# Patient Record
Sex: MALE | Race: BLACK OR AFRICAN AMERICAN | Employment: UNEMPLOYED | URBAN - METROPOLITAN AREA
[De-identification: names, ages, dates, MRNs, and addresses within clinical notes are randomized per-mention and may not be internally consistent; named-entity substitution may affect disease eponyms.]

---

## 2019-09-24 ENCOUNTER — HOSPITAL ENCOUNTER (INPATIENT)
Age: 49
LOS: 6 days | Discharge: HOME | DRG: 751 | End: 2019-09-30
Attending: PSYCHIATRY & NEUROLOGY | Admitting: PSYCHIATRY & NEUROLOGY
Payer: MEDICAID

## 2019-09-24 PROBLEM — F32.9 MAJOR DEPRESSIVE DISORDER: Status: ACTIVE | Noted: 2019-09-24

## 2019-09-24 PROCEDURE — 74011250637 HC RX REV CODE- 250/637: Performed by: PSYCHIATRY & NEUROLOGY

## 2019-09-24 PROCEDURE — 65220000003 HC RM SEMIPRIVATE PSYCH

## 2019-09-24 RX ORDER — GABAPENTIN 300 MG/1
300 CAPSULE ORAL
COMMUNITY
End: 2019-09-30

## 2019-09-24 RX ORDER — FLUTICASONE PROPIONATE AND SALMETEROL 250; 50 UG/1; UG/1
1 POWDER RESPIRATORY (INHALATION) EVERY 12 HOURS
COMMUNITY

## 2019-09-24 RX ORDER — LORAZEPAM 2 MG/ML
1-2 INJECTION INTRAMUSCULAR
Status: DISCONTINUED | OUTPATIENT
Start: 2019-09-24 | End: 2019-09-30 | Stop reason: HOSPADM

## 2019-09-24 RX ORDER — SEVELAMER CARBONATE 800 MG/1
800 TABLET, FILM COATED ORAL
Status: DISCONTINUED | OUTPATIENT
Start: 2019-09-24 | End: 2019-09-25

## 2019-09-24 RX ORDER — BUDESONIDE AND FORMOTEROL FUMARATE DIHYDRATE 160; 4.5 UG/1; UG/1
2 AEROSOL RESPIRATORY (INHALATION)
Status: DISCONTINUED | OUTPATIENT
Start: 2019-09-24 | End: 2019-09-30 | Stop reason: HOSPADM

## 2019-09-24 RX ORDER — HYDRALAZINE HYDROCHLORIDE 50 MG/1
50 TABLET, FILM COATED ORAL
COMMUNITY

## 2019-09-24 RX ORDER — SEVELAMER CARBONATE 800 MG/1
800 TABLET, FILM COATED ORAL 3 TIMES DAILY
COMMUNITY

## 2019-09-24 RX ORDER — FLUOXETINE HYDROCHLORIDE 20 MG/1
40 CAPSULE ORAL DAILY
Status: DISCONTINUED | OUTPATIENT
Start: 2019-09-25 | End: 2019-09-30 | Stop reason: HOSPADM

## 2019-09-24 RX ORDER — HALOPERIDOL 5 MG/1
5 TABLET ORAL
Status: DISCONTINUED | OUTPATIENT
Start: 2019-09-24 | End: 2019-09-25

## 2019-09-24 RX ORDER — HYDROXYZINE PAMOATE 50 MG/1
50 CAPSULE ORAL
Status: DISCONTINUED | OUTPATIENT
Start: 2019-09-24 | End: 2019-09-30 | Stop reason: HOSPADM

## 2019-09-24 RX ORDER — HYDRALAZINE HYDROCHLORIDE 50 MG/1
50 TABLET, FILM COATED ORAL
Status: DISCONTINUED | OUTPATIENT
Start: 2019-09-24 | End: 2019-09-25

## 2019-09-24 RX ORDER — AMLODIPINE BESYLATE 5 MG/1
10 TABLET ORAL DAILY
Status: DISCONTINUED | OUTPATIENT
Start: 2019-09-25 | End: 2019-09-30 | Stop reason: HOSPADM

## 2019-09-24 RX ORDER — ACETAMINOPHEN 500 MG
500 TABLET ORAL
Status: DISCONTINUED | OUTPATIENT
Start: 2019-09-24 | End: 2019-09-30 | Stop reason: HOSPADM

## 2019-09-24 RX ORDER — MONTELUKAST SODIUM 10 MG/1
10 TABLET ORAL DAILY
COMMUNITY

## 2019-09-24 RX ORDER — HALOPERIDOL 5 MG/ML
5 INJECTION INTRAMUSCULAR
Status: DISCONTINUED | OUTPATIENT
Start: 2019-09-24 | End: 2019-09-25

## 2019-09-24 RX ORDER — DOXEPIN HYDROCHLORIDE 50 MG/1
50 CAPSULE ORAL
COMMUNITY
End: 2019-09-30

## 2019-09-24 RX ORDER — TRAZODONE HYDROCHLORIDE 50 MG/1
50 TABLET ORAL
Status: DISCONTINUED | OUTPATIENT
Start: 2019-09-24 | End: 2019-09-27

## 2019-09-24 RX ORDER — NITROGLYCERIN 20 MG/1
1 PATCH TRANSDERMAL DAILY
COMMUNITY
End: 2019-09-30

## 2019-09-24 RX ORDER — IPRATROPIUM BROMIDE AND ALBUTEROL SULFATE 2.5; .5 MG/3ML; MG/3ML
3 SOLUTION RESPIRATORY (INHALATION)
COMMUNITY

## 2019-09-24 RX ORDER — FLUOXETINE HYDROCHLORIDE 40 MG/1
40 CAPSULE ORAL DAILY
Status: ON HOLD | COMMUNITY
End: 2019-09-29 | Stop reason: SDUPTHER

## 2019-09-24 RX ORDER — AMLODIPINE BESYLATE AND ATORVASTATIN CALCIUM 10; 10 MG/1; MG/1
1 TABLET, FILM COATED ORAL DAILY
COMMUNITY

## 2019-09-24 RX ORDER — IPRATROPIUM BROMIDE AND ALBUTEROL SULFATE 2.5; .5 MG/3ML; MG/3ML
3 SOLUTION RESPIRATORY (INHALATION)
Status: DISCONTINUED | OUTPATIENT
Start: 2019-09-24 | End: 2019-09-30 | Stop reason: HOSPADM

## 2019-09-24 RX ADMIN — TRAZODONE HYDROCHLORIDE 50 MG: 50 TABLET ORAL at 20:55

## 2019-09-24 RX ADMIN — HYDROXYZINE PAMOATE 50 MG: 50 CAPSULE ORAL at 20:55

## 2019-09-25 PROBLEM — E11.8 TYPE 2 DIABETES MELLITUS WITH COMPLICATION, WITHOUT LONG-TERM CURRENT USE OF INSULIN (HCC): Status: ACTIVE | Noted: 2019-09-25

## 2019-09-25 PROBLEM — N18.6 ESRD (END STAGE RENAL DISEASE) ON DIALYSIS (HCC): Status: ACTIVE | Noted: 2019-09-25

## 2019-09-25 PROBLEM — F12.20 CANNABIS USE DISORDER, SEVERE, DEPENDENCE (HCC): Status: ACTIVE | Noted: 2019-09-25

## 2019-09-25 PROBLEM — I15.1 HYPERTENSION SECONDARY TO OTHER RENAL DISORDERS: Status: ACTIVE | Noted: 2019-09-25

## 2019-09-25 PROBLEM — N28.89 HYPERTENSION SECONDARY TO OTHER RENAL DISORDERS: Status: ACTIVE | Noted: 2019-09-25

## 2019-09-25 PROBLEM — Z99.2 ESRD (END STAGE RENAL DISEASE) ON DIALYSIS (HCC): Status: ACTIVE | Noted: 2019-09-25

## 2019-09-25 PROBLEM — F14.20 COCAINE USE DISORDER, SEVERE, DEPENDENCE (HCC): Status: ACTIVE | Noted: 2019-09-25

## 2019-09-25 PROBLEM — F33.9 RECURRENT MAJOR DEPRESSIVE DISORDER (HCC): Status: ACTIVE | Noted: 2019-09-24

## 2019-09-25 LAB
ALBUMIN SERPL-MCNC: 3.6 G/DL (ref 3.4–5)
ANION GAP SERPL CALC-SCNC: 11 MMOL/L (ref 3–18)
BUN SERPL-MCNC: 101 MG/DL (ref 7–18)
BUN/CREAT SERPL: 8 (ref 12–20)
CALCIUM SERPL-MCNC: 6.9 MG/DL (ref 8.5–10.1)
CALCIUM SERPL-MCNC: 7.4 MG/DL (ref 8.5–10.1)
CHLORIDE SERPL-SCNC: 101 MMOL/L (ref 100–111)
CO2 SERPL-SCNC: 25 MMOL/L (ref 21–32)
CREAT SERPL-MCNC: 12.3 MG/DL (ref 0.6–1.3)
FERRITIN SERPL-MCNC: 725 NG/ML (ref 8–388)
GLUCOSE SERPL-MCNC: 97 MG/DL (ref 74–99)
HCT VFR BLD AUTO: 27.8 % (ref 36–48)
HGB BLD-MCNC: 9.1 G/DL (ref 13–16)
IRON SATN MFR SERPL: 35 %
IRON SERPL-MCNC: 102 UG/DL (ref 50–175)
PHOSPHATE SERPL-MCNC: 6.7 MG/DL (ref 2.5–4.9)
POTASSIUM SERPL-SCNC: 5.2 MMOL/L (ref 3.5–5.5)
PTH-INTACT SERPL-MCNC: 1072.5 PG/ML (ref 18.4–88)
SODIUM SERPL-SCNC: 137 MMOL/L (ref 136–145)
TIBC SERPL-MCNC: 288 UG/DL (ref 250–450)

## 2019-09-25 PROCEDURE — 65220000003 HC RM SEMIPRIVATE PSYCH

## 2019-09-25 PROCEDURE — 83540 ASSAY OF IRON: CPT

## 2019-09-25 PROCEDURE — 74011250637 HC RX REV CODE- 250/637: Performed by: INTERNAL MEDICINE

## 2019-09-25 PROCEDURE — 85018 HEMOGLOBIN: CPT

## 2019-09-25 PROCEDURE — 82728 ASSAY OF FERRITIN: CPT

## 2019-09-25 PROCEDURE — 74011250637 HC RX REV CODE- 250/637: Performed by: PSYCHIATRY & NEUROLOGY

## 2019-09-25 PROCEDURE — 83970 ASSAY OF PARATHORMONE: CPT

## 2019-09-25 PROCEDURE — 74011250636 HC RX REV CODE- 250/636: Performed by: INTERNAL MEDICINE

## 2019-09-25 PROCEDURE — 80069 RENAL FUNCTION PANEL: CPT

## 2019-09-25 PROCEDURE — 36415 COLL VENOUS BLD VENIPUNCTURE: CPT

## 2019-09-25 PROCEDURE — 90935 HEMODIALYSIS ONE EVALUATION: CPT

## 2019-09-25 RX ORDER — HYDRALAZINE HYDROCHLORIDE 50 MG/1
50 TABLET, FILM COATED ORAL 4 TIMES DAILY
Status: DISCONTINUED | OUTPATIENT
Start: 2019-09-25 | End: 2019-09-25

## 2019-09-25 RX ORDER — TRAMADOL HYDROCHLORIDE AND ACETAMINOPHEN 37.5; 325 MG/1; MG/1
1 TABLET ORAL
Status: DISPENSED | OUTPATIENT
Start: 2019-09-25 | End: 2019-09-29

## 2019-09-25 RX ORDER — CALCIUM ACETATE 667 MG/1
3 CAPSULE ORAL
Status: DISCONTINUED | OUTPATIENT
Start: 2019-09-25 | End: 2019-09-30 | Stop reason: HOSPADM

## 2019-09-25 RX ORDER — HYDRALAZINE HYDROCHLORIDE 50 MG/1
100 TABLET, FILM COATED ORAL 3 TIMES DAILY
Status: DISCONTINUED | OUTPATIENT
Start: 2019-09-25 | End: 2019-09-30 | Stop reason: HOSPADM

## 2019-09-25 RX ORDER — HEPARIN SODIUM 5000 [USP'U]/ML
4000 INJECTION, SOLUTION INTRAVENOUS; SUBCUTANEOUS
Status: DISCONTINUED | OUTPATIENT
Start: 2019-09-25 | End: 2019-09-26

## 2019-09-25 RX ORDER — DOXERCALCIFEROL 4 UG/2ML
3 INJECTION INTRAVENOUS
Status: DISCONTINUED | OUTPATIENT
Start: 2019-09-25 | End: 2019-09-30 | Stop reason: HOSPADM

## 2019-09-25 RX ORDER — SODIUM CHLORIDE 9 MG/ML
100 INJECTION, SOLUTION INTRAVENOUS
Status: DISCONTINUED | OUTPATIENT
Start: 2019-09-25 | End: 2019-09-30 | Stop reason: HOSPADM

## 2019-09-25 RX ADMIN — DOXERCALCIFEROL 3 MCG: 4 INJECTION, SOLUTION INTRAVENOUS at 19:05

## 2019-09-25 RX ADMIN — SEVELAMER CARBONATE 800 MG: 800 TABLET, FILM COATED ORAL at 11:25

## 2019-09-25 RX ADMIN — TRAMADOL HYDROCHLORIDE AND ACETAMINOPHEN 1 TABLET: 37.5; 325 TABLET, FILM COATED ORAL at 16:39

## 2019-09-25 RX ADMIN — BUDESONIDE AND FORMOTEROL FUMARATE DIHYDRATE 2 PUFF: 160; 4.5 AEROSOL RESPIRATORY (INHALATION) at 20:38

## 2019-09-25 RX ADMIN — AMLODIPINE BESYLATE 10 MG: 5 TABLET ORAL at 08:08

## 2019-09-25 RX ADMIN — HYDROXYZINE PAMOATE 50 MG: 50 CAPSULE ORAL at 16:39

## 2019-09-25 RX ADMIN — BUDESONIDE AND FORMOTEROL FUMARATE DIHYDRATE 2 PUFF: 160; 4.5 AEROSOL RESPIRATORY (INHALATION) at 09:11

## 2019-09-25 RX ADMIN — HYDRALAZINE HYDROCHLORIDE 50 MG: 50 TABLET, FILM COATED ORAL at 17:22

## 2019-09-25 RX ADMIN — HYDRALAZINE HYDROCHLORIDE 100 MG: 50 TABLET, FILM COATED ORAL at 20:38

## 2019-09-25 RX ADMIN — TRAMADOL HYDROCHLORIDE AND ACETAMINOPHEN 1 TABLET: 37.5; 325 TABLET, FILM COATED ORAL at 22:16

## 2019-09-25 RX ADMIN — HYDRALAZINE HYDROCHLORIDE 50 MG: 50 TABLET, FILM COATED ORAL at 12:15

## 2019-09-25 RX ADMIN — ACETAMINOPHEN 500 MG: 500 TABLET ORAL at 20:38

## 2019-09-25 RX ADMIN — HEPARIN SODIUM 4000 UNITS: 5000 INJECTION INTRAVENOUS; SUBCUTANEOUS at 15:30

## 2019-09-25 RX ADMIN — TRAZODONE HYDROCHLORIDE 50 MG: 50 TABLET ORAL at 20:37

## 2019-09-25 RX ADMIN — HYDROXYZINE PAMOATE 50 MG: 50 CAPSULE ORAL at 20:38

## 2019-09-25 RX ADMIN — HYDRALAZINE HYDROCHLORIDE 50 MG: 50 TABLET, FILM COATED ORAL at 09:11

## 2019-09-25 RX ADMIN — SEVELAMER CARBONATE 800 MG: 800 TABLET, FILM COATED ORAL at 08:07

## 2019-09-25 RX ADMIN — FLUOXETINE 40 MG: 20 CAPSULE ORAL at 08:07

## 2019-09-25 NOTE — GROUP NOTE
[facility] GROUP DOCUMENTATION INDIVIDUAL Group Therapy Note Date: September 25 Group Start Time: 12 Group End Time: 1500 Group Topic: Medication [facility] 1 ADULT CHEM DEP Breanna Negro, RN 
 
[facility] GROUP DOCUMENTATION GROUP Group Therapy Note Attendees:6 Attendance: Did not attend Zhou Johnson

## 2019-09-25 NOTE — PROGRESS NOTES
conducted an Spirituality Group for [employer] F[facility], who is a 54 y.o.,male. Patient's Primary Language is: [place]. According to the patient's EMR Jewish Affiliation is: Non Mormonism.     The reason the Patient came to the hospital is:   Patient Active Problem List    Diagnosis Date Noted    Major depressive disorder 09/24/2019         conducted Spirituality Group on \"Changes\" and the patient was one of the participants. The  provided the following Interventions:  Continued the relationship of care and support. Listened empathically. Offered prayer and assurance of continued prayer on patients behalf. Chart reviewed. The following outcomes were achieved:  Patient expressed gratitude for 's visit.  w  Assessment:  There are no further spiritual or Taoist issues which require Spiritual Care Services interventions at this time. Plan:  Chaplains will continue to follow and will provide pastoral care on an as needed/requested basis.  recommends bedside caregivers page  on duty if patient shows signs of acute spiritual or emotional distress. Chaplain Tania Fuentes [facility]   [phone]
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
## 2019-09-25 NOTE — DIALYSIS
ACUTE HEMODIALYSIS FLOW SHEET    HEMODIALYSIS ORDERS: Physician: Dr. Larsen Remedies: Revaclear   Duration: 4 hr  BFR: 400   DFR: 800   Dialysate:  Temp 36.5   K+   2    Ca+  3   Na 138   Bicarb 35   Wt Readings from Last 1 Encounters:   No data found for Wt    Patient Chart [x]   Unable to Obtain []  Dry weight/UF Goal: 4000 ml  Access RUE AVF     Heparin [x]  Bolus    Units    [] Hourly    Units    []None      Catheter locking solution n/a   Pre BP:   195/99    Pulse:  94   Respirations: 20    Temperature:  98.0    Tx: NSS   ml/Bolus   [] N/A   Labs: []  Pre  []  Post:   [x] N/A   Additional Orders(medications, blood products, hypotension management): [x] Yes   [] No     [x]  [facility] Consent Verified     CATHETER ACCESS:  [x]N/A   []Right   []Left   []IJ     []Fem   []Chest wall   [] First use X-ray verified     []Tunnel    [] Non Tunneled   []No S/S infection  []Redness  []Drainage []Cultured []Swelling []Pain   []Medical Aseptic Prep Utilized   []Dressing Changed  [] Biopatch  Date:    []Clotted   []Patent   Flows: []Good  []Poor  []Reversed   If access problem,  notified: []Yes    []N/A        GRAFT/FISTULA ACCESS:   []N/A     [x]Right     []Left     [x]UE     []LE   []AVG   [x]AVF     []Buttonhole    [x]Medical Aseptic Prep Utilized   [x]No S/S infection  []Redness  []Drainage []Cultured  [] Swelling  [] Pain  Bruit:   [x] Strong    [] Weak       Thrill :   [x] Strong    [] Weak     Needle Gauge: 15   Length: 1 inch   If access problem,  notified: []Yes     [x]N/A     Please describe access if present and not used: N/A       GENERAL ASSESSMENT:    LUNGS:  Rate 20   SaO2%      [] Clear  [x] Coarse  [] Crackles  [] Wheezing                                                [] Diminished     Location : []RLL   []LLL    []RUL  []SOFI   Cough: []Productive  [x]Dry  []N/A   Respirations:  [x]Easy  []Labored   Therapy:  [x]RA  []NC  l/min    Mask: []NRB  [] Venti    O2% []Ventilator  []Intubated  [] Trach  [] BiPaP   CARDIAC: [x]Regular      [] Irregular   [] Pericardial Rub  [] JVD          []  Monitored  [] Bedside  [] Remotely monitored     EDEMA: [] None  [x]Generalized  [] Pitting [] 1    [] 2    [] 3    [] 4                 [] Facial  [] Pedal  []  UE  [] LE   SKIN:   [x] Warm  [] Hot     [] Cold   [x] Dry     [] Pale   [] Diaphoretic                  [] Flushed  [] Jaundiced  [] Cyanotic  [] Rash  [] Weeping   LOC:    [x] Alert      [x]Oriented:    [x] Person     [x] Place  [x]Time               [] Confused  [] Lethargic  [] Medicated  [] Non-responsive   GI / ABDOMEN:                     [] Flat    [] Distended    [x] Soft    [] Firm   []  Obese                   [] Diarrhea  [x] Bowel Sounds  [] Nausea  [] Vomiting     / URINE ASSESSMENT:                   [] Voiding   [x] Oliguria  [] Anuria   []  Cervantes                  [] Incontinent  []  Incontinent Brief []  Bathroom Privileges     PAIN:  [x] 0 []1  []2   []3   []4   []5   []6   []7   []8   []9   []10            Scale 0-10  Action/Follow Up:    MOBILITY:  [x] Bed    [] Stretcher      All Vitals and Treatment Details on Attached [address]: [facility]          Room # 127/02   [] 1st Time Acute      [] Stat       [x] Routine      [] Urgent     [x] Acute Room  []  Bedside  [] ICU/CCU  [] ER   Isolation Precautions:  [x] Dialysis    There are currently no Active Isolations       ALLERGIES:     Allergies   Allergen Reactions    Carvedilol Hives    Mushroom Other (comments)     Throat closure and sever tongue edema      Code Status:  No Order     Hepatitis Status   2nd RN check :  [address]   9/13/19 Neg/Suss - results from Chronic clinic      Current Labs:      Lab Results   Component Value Date/Time    HGB 9.1 (L) 09/25/2019 11:33 AM    HCT 27.8 (L) 09/25/2019 11:33 AM     Lab Results   Component Value Date/Time    Sodium 137 09/25/2019 11:33 AM    Potassium 5.2 09/25/2019 11:33 AM    Chloride 101 09/25/2019 11:33 AM CO2 25 09/25/2019 11:33 AM    Anion gap 11 09/25/2019 11:33 AM    Glucose 97 09/25/2019 11:33 AM     (H) 09/25/2019 11:33 AM    Creatinine 12.30 (H) 09/25/2019 11:33 AM    BUN/Creatinine ratio 8 (L) 09/25/2019 11:33 AM    GFR est AA 5 (L) 09/25/2019 11:33 AM    GFR est non-AA 4 (L) 09/25/2019 11:33 AM    Calcium 7.4 (L) 09/25/2019 11:33 AM    Calcium 6.9 (L) 09/25/2019 11:33 AM          DIET:  DIET RENAL      PRIMARY NURSE REPORT:   Pre Dialysis: Hector Delgado RN     Time: 5570        EDUCATION:    [x] Patient [] Other           Knowledge Basis: []None [x]Minimal [] Substantial   Barriers to learning  [x]N/A   [] Access Care     [] S&S of infection  [] Fluid Management  [] K+ [x] Procedural    []Albumin   [] Medications   [] Tx Options   [] Transplant   [] Diet   [] Other   Teaching Tools:  [x] Explain  [] Demo  [] Handouts [] Video  Patient response: [x] Verbalized understanding  [] Teach back  [] Return demonstration   [] Requires follow up      [x]Time Out/Safety Check  [x] Extracorporeal Circuit Tested for integrity       RO/HEMODIALYSIS MACHINE SAFETY CHECKS  Before each treatment:     Machine Number:                   [facility]                                     [x] Unit Machine # 8 with centralized RO                                                                                                     Alarm Test:  Pass time 1500            [x] RO/Machine Log Complete    Machine Temp    36.5             Dialysate: pH  7.4   Conductivity: Meter 14.0    HD Machine  14.0    TCD: 13.7  Dialyzer Lot # [id number]    Blood Tubing Lot # [id number]    Saline Lot # -[id number]     CHLORINE TESTING-Before each treatment and every 4 hours    Total Chlorine: [x] less than 0.1 ppm  Initial Time Check: 1300       4 Hr/2nd Check Time: 1700   (if greater than 0.1 ppm from Primary then every 30 minutes from Secondary)     TREATMENT INITIATION  with Dialysis Precautions:   [x] All Connections Secured [x] Saline Line Double Clamped   [x] Venous Parameters Set               [x] Arterial Parameters Set    [x] Prime Given 250ml NSS              [x]Air Foam Detector Engaged      Treatment Initiation Note:  1500  Pt received to dialysis unit awake , alert and cooperative on room air. 1  RUE AVF cannulated without difficulty and dialysis initiated. During Treatment Notes:  1600  Vascular access visible with arterial and venous line connections intact. Pt sleeping  1700  Vascular access visible with arterial and venous line connections intact. Pt sleeping  1800  Vascular access visible with arterial and venous line connections intact. Pt sleeping  1900  Vascular access visible with arterial and venous line connections intact. Medication Dose Volume Route Time [facility] Name Title   Heparin 4000U  HD 1800 [address], [address] 5 3 mcg  HD [facility] RN     Post Assessment  Dialyzer Cleared:[] Good [x] Fair  [] Poor  Blood processed:  82.6 L  UF Removed:  4000 Ml  Post Wt: 106.5 kg  Post BP:203/96 Pulse:111 Resp:20 Temp:98.4 Lungs: [] Clear [x] Course  [] Crackles                 [] Wheezing [] Diminished   Post Tx Vascular Access: [] N/A  AVF/AVG: Bleeding stopped with  Arterial Pressure for 10 min. Venous Pressure for 10 min      Cardiac:[x] Regular   [] Irregular   Rhythm:  [] Monitored   [] Not Monitored    CVC Catheter: [x] N/A     Edema:  [] None  [x] General [] Facial    [] Pedal   [] UE [] LE   Skin:[x] Warm  [x] Dry [] Diaphoretic               [] Flushed  [] Pale [] Cyanotic   Pain:  [x]0  []1 []2  []3 []4  []5  []6 []7 []8  []9  []10       Post Treatment Note:   1930  Pt tolerated HD well. Arterial and venous cannulas removed and pressure applied until both sites clean and dry.   Dry dressing applied     POST TREATMENT PRIMARY NURSE HANDOFF REPORT:   Post Dialysis: Paul Mcginnis RN                Time:  1930         Abbreviations: AVG-arterial venous graft, AVF-arterial venous fistula, IJ-Internal Jugular, Subcl-Subclavian, Fem-Femoral, Tx-treatment, AP/HR-apical heart rate, DFR-dialysate flow rate, BFR-blood flow rate, AP-arterial pressure, -venous pressure, UF-ultrafiltrate, TMP-transmembrane pressure, Jerry-Venous, Art-Arterial, RO-Reverse Osmosis

## 2019-09-25 NOTE — BH NOTES
Patient arrived to unit via wheelchair, alert and oriented, accompanied by 2 medics and a hospital . Upon arrival, patient denies thoughts to harm self or others and contract to safety. Patient was cooperative with admission process, patient stated that he felt overwhelmed with issues going on with health and does not have any support nor place to live then thought about harming self. Patient stated that he does not drink alcohol but smoke cigarette occasionally. Patient has fistula in right arm for dialysis. He has dialysis scheduled Mondays, Wednesdays, and Fridays. Patient ate 100% meal given to him, encouraged to complete his hygiene, unit tour completed, unit rules and guidelines given. Patient remains on suicide precaution and will be monitor every 15 minutes and staggered for safety. RN will initiate, develop, implement, review or revise treatment plan. Dutch Bell

## 2019-09-25 NOTE — BH NOTES
Treatment team [facility] -     Medical Director: __x___present   Psychiatrist: __x___present   Charge nurse: __x___present   MSW: _x____present   : _____present   Nurse Manager: _____present   Student RNs: _____present   Medical Students: _____present   Art Therapist: _x____present   Clinical Coordinator: __x___present    Occupational Therapist: __x___present   : _______ present  UR  __x_____ present  Crisis Supervisor___x____present      Plan of care discussed and updated as appropriate.

## 2019-09-25 NOTE — CONSULTS
Consult Note  Consult requested by: Dr. Leti Espinosa is a 54 y.o. male [address] who is being seen on consult for ESRD/HD  No chief complaint on file. Admission diagnosis: <principal problem not specified>     HPI: 53 yo AA male admitted to Psych Unit for depression and suicidal ideation. Patient has known Hx of ESRD from HTN and DM and admits he has been non compliant with HD. He follows with Dr. Edita Ralph in [facility] and was last dialyzed in [facility] ([phone]) 2 weeks ago. He was apparently admitted at [facility] for in the last week or so for CHF, Hyperkalemia and uncontrolled HTN. He claims he was dialyzed on Monday before transfer to the Psych unit yesterday. Hx of long standing HTN/DM2/ COPD/Pulmo HTN/GLORIA ESRD x 2 years, non compliant. Hx of chronic cocaine and marijuana use. Hx of depression. Denies any MI/CVA. Anuric. Currently denies any CP or SOB. No problems with HD has a Right AVF, working well. Claims he always runs high BP and he tolerates 4-6 Kg UF/HD    No past medical history on file. No past surgical history on file.       Social History     Socioeconomic History    Marital status: SINGLE     Spouse name: Not on file    Number of children: Not on file    Years of education: Not on file    Highest education level: Not on file   Occupational History    Not on file   Social Needs    Financial resource strain: Not on file    Food insecurity:     Worry: Not on file     Inability: Not on file    Transportation needs:     Medical: Not on file     Non-medical: Not on file   Tobacco Use    Smoking status: Not on file   Substance and Sexual Activity    Alcohol use: Not on file    Drug use: Not on file    Sexual activity: Not on file   Lifestyle    Physical activity:     Days per week: Not on file     Minutes per session: Not on file    Stress: Not on file   Relationships    Social connections:     Talks on phone: Not on file Gets together: Not on file     Attends Jew service: Not on file     Active member of club or organization: Not on file     Attends meetings of clubs or organizations: Not on file     Relationship status: Not on file    Intimate partner violence:     Fear of current or ex partner: Not on file     Emotionally abused: Not on file     Physically abused: Not on file     Forced sexual activity: Not on file   Other Topics Concern    Not on file   Social History Narrative    Not on file       No family history on file. Allergies   Allergen Reactions    Carvedilol Hives    Mushroom Other (comments)     Throat closure and sever tongue edema        Home Medications:     Prior to Admission Medications   Prescriptions Last Dose Informant Patient Reported? Taking? FLUoxetine (PROZAC) 40 mg capsule   Yes Yes   Sig: Take 40 mg by mouth daily. Indications: Depression   albuterol-ipratropium (DUO-NEB) 2.5 mg-0.5 mg/3 ml nebu   Yes Yes   Sig: 3 mL by Nebulization route every six (6) hours as needed. Indications: Bronchi Muscle Spasm resulting from COPD   amLODIPine-atorvastatin (CADUET) 10-10 mg per tablet   Yes Yes   Sig: Take 1 Tab by mouth daily. doxepin (SINEQUAN) 50 mg capsule   Yes Yes   Sig: Take 50 mg by mouth. Indications: anxious   fluticasone propion-salmeterol (ADVAIR/WIXELA) 250-50 mcg/dose diskus inhaler   Yes Yes   Sig: Take 1 Puff by inhalation every twelve (12) hours. gabapentin (NEURONTIN) 300 mg capsule   Yes Yes   Sig: Take 300 mg by mouth nightly. Indications: Neuropathic Pain   hydrALAZINE (APRESOLINE) 50 mg tablet   Yes Yes   Sig: Take 50 mg by mouth four (4) times daily as needed. montelukast (SINGULAIR) 10 mg tablet   Yes Yes   Sig: Take 10 mg by mouth daily. nitroglycerin (NITRODUR) 0.1 mg/hr   Yes Yes   Si Patch by TransDERmal route daily. sevelamer carbonate (RENVELA) 800 mg tab tab   Yes Yes   Sig: Take 800 mg by mouth three (3) times daily.       Facility-Administered Medications: None       Current Facility-Administered Medications   Medication Dose Route Frequency    hydrOXYzine pamoate (VISTARIL) capsule 50 mg  50 mg Oral Q4H PRN    haloperidol (HALDOL) tablet 5 mg  5 mg Oral Q4H PRN    haloperidol lactate (HALDOL) injection 5 mg  5 mg IntraMUSCular Q4H PRN    LORazepam (ATIVAN) injection 1-2 mg  1-2 mg IntraMUSCular Q4H PRN    traZODone (DESYREL) tablet 50 mg  50 mg Oral QHS PRN    acetaminophen (TYLENOL) tablet 500 mg  500 mg Oral Q6H PRN    amLODIPine (NORVASC) tablet 10 mg  10 mg Oral DAILY    FLUoxetine (PROzac) capsule 40 mg  40 mg Oral DAILY    budesonide-formoterol (SYMBICORT) 160-4.5 mcg/actuation HFA inhaler 2 Puff  2 Puff Inhalation BID RT    hydrALAZINE (APRESOLINE) tablet 50 mg  50 mg Oral QID PRN    albuterol-ipratropium (DUO-NEB) 2.5 MG-0.5 MG/3 ML  3 mL Nebulization Q6H PRN    sevelamer carbonate (RENVELA) tab 800 mg  800 mg Oral TID WITH MEALS       Review of Systems:   Pertinent items are noted in HPI. Data Review:    Labs: Results:       Chemistry No results for input(s): GLU, NA, K, CL, CO2, BUN, CREA, CA, AGAP, BUCR, TBIL, GPT, AP, TP, ALB, GLOB, AGRAT in the last 72 hours. CBC w/Diff No results for input(s): WBC, RBC, HGB, HCT, PLT, GRANS, LYMPH, EOS, HGBEXT, HCTEXT, PLTEXT in the last 72 hours. Coagulation No results for input(s): PTP, INR, APTT, INREXT in the last 72 hours. Iron/Ferritin No results for input(s): IRON in the last 72 hours. No lab exists for component: TIBCCALC   BNP No results for input(s): BNPP in the last 72 hours. Cardiac Enzymes No results for input(s): CPK, CKND1, KAJAL in the last 72 hours. No lab exists for component: CKRMB, TROIP   Liver Enzymes No results for input(s): TP, ALB, TBIL, AP, SGOT, GPT in the last 72 hours.     No lab exists for component: DBIL   Thyroid Studies No results found for: T4, T3U, TSH, TSHEXT              Physical Assessment:     Visit Vitals  BP (!) 193/101   Pulse 100   Temp 98.8 °F (37.1 °C)   Resp 18     There were no vitals filed for this visit. No intake or output data in the 24 hours ending 09/25/19 1103    Physial Exam:  General appearance: alert, cooperative, no distress, appears stated age  Skin: no rash  HEENT: non icteric, pinkish conj  Neck: No JVD  Lungs: clear to auscultation bilaterally  Heart: regular rate and rhythm, no rub  Abdomen: soft, non-tender. Bowel sounds normal.   Extremities: No LE edema, Right arm avf + bruit    IMPRESSION AND PLAN:   ESRD . Schedule HD MWF. Will get records from his primary unit. Renal Labs ordered. No signs of fluid overload. Explained need to stay of renal diet. HTN cont CCB and Hydralazine trend BP  Depression defer to Dr. Sharda Giles     Thank you will follow with you.     Mulugeta Valle MD  September 25, 2019

## 2019-09-25 NOTE — BSMART NOTE
SW assessment/Intervention:  Soham Pope is a 54 y.o. BLACK OR  male with a history of ESRD on dialysis, HTN, DM, Cardiomyopathy with Arrhythmia, Major depressive Disorder, Cocaine Use Disorder who was admitted voluntarily for suicidal ideation. Pt is a fair historian. Pt states he was thinking about overdosing on all his medications due to feeling very depressed, helpless and hopeless. Stressors related to homelessness, chronic medical problems, dealing with chronic pain issues, dealing with losses in the family and family problems in general.  Patient is observed engaged with peers in the milieu. Patient is pleasant and his affect is calm. Patient report several stressors related to this admission. Patient reports he was residing with a friend and paid him rent and one day he returned from dialysis and the house was vacant and his clothing were left outside. Patient reports he is disable and has never faced these stressors. Patient reports he can reside with his sister however, his pride will not allow this. SW informed patient of several options for placement and will assist as needed. SW encouraged patient to attend group therapy as well as positive peer interaction. SW will continue to monitor patient as needed.  
 
KACEY Cannon

## 2019-09-25 NOTE — BH NOTES
Pt slept 3 hrs. Pt requested gloves to clean out toilet so he could wash his clothes. Staff declined the gloves. Pt remained awake for duration of night. Pt processed with staff at end of shift.

## 2019-09-25 NOTE — PROGRESS NOTES
Problem: Suicide  Goal: *STG: Remains safe in hospital  Description  Patient will be free from injurious behavior through hospital stay   Outcome: Progressing Towards Goal  Note:   Patient will remain safe in the hospital daily  Goal: *STG: Attends activities and groups  Description  Patient will attend and participate in at least 3 of 4 groups and activities daily through hospital stay   Outcome: Progressing Towards Goal  Note:   Patient will attend activities and groups daily  Goal: *STG/LTG: Complies with medication therapy  Description  Patient will receive medications as scheduled through hospital stay   Outcome: Progressing Towards Goal  Note:   Patient will comply with medication therapy daily    Patiet has been visible and active on the unit. Positively interacting with staff and peers. Reported his concern about his dialysis schedule. Informed him that he would be seeing nephrologist today. Patient also discussed his concern regarding nebulizer treatment. Informed MD. BARTON to meet with him to discuss his concerns. Denies current thoughts of SI and/or self harm. Will continue to monitor for safety and support.

## 2019-09-25 NOTE — BSMART NOTE
ART THERAPY GROUP PROGRESS NOTE PATIENT SCHEDULED FOR GROUP AT: 13:00 
 
ATTENDANCE: Full PARTICIPATION LEVEL: Participates fully in the art process ATTENTION LEVEL: Able to focus on task FOCUS: Anxiety reduction SYMBOLIC & THEMATIC CONTENT AS NOTED IN IMAGERY: He was cheerful and presented with a bright affect. He actively participated in group discussion and art directive. He became drowsy while group members shared their work and was in and out of sleep and the group table the last ten minutes.

## 2019-09-25 NOTE — PROGRESS NOTES
Hemodialysis Rounding Note      Patient: Makenzie Moore               Sex: male          DOA: 2019  5:29 PM        YOB: 1970      Age:  54 y.o.        LOS:  LOS: 1 day     Subjective:     Makenzie Moore is a 54 y.o.  who presents with Major depressive disorder [F32.9]. The patient is dialyzing utilizing the following method:Intermittent Hemodialysis        Complaints none. Current Facility-Administered Medications   Medication Dose Route Frequency    hydrALAZINE (APRESOLINE) tablet 50 mg  50 mg Oral QID    traMADol-acetaminophen (ULTRACET) per tablet 1 Tab  1 Tab Oral Q6H PRN    0.9% sodium chloride infusion  100 mL/hr IntraVENous DIALYSIS PRN    doxercalciferol (HECTOROL) 4 mcg/2 mL injection 3 mcg  3 mcg IntraVENous DIALYSIS MON, WED & FRI    heparin (porcine) injection 4,000 Units  4,000 Units IntraVENous DIALYSIS MON, WED & FRI    calcium acetate (PHOSLO) capsule 2,001 mg  3 Cap Oral TID WITH MEALS    hydrOXYzine pamoate (VISTARIL) capsule 50 mg  50 mg Oral Q4H PRN    LORazepam (ATIVAN) injection 1-2 mg  1-2 mg IntraMUSCular Q4H PRN    traZODone (DESYREL) tablet 50 mg  50 mg Oral QHS PRN    acetaminophen (TYLENOL) tablet 500 mg  500 mg Oral Q6H PRN    amLODIPine (NORVASC) tablet 10 mg  10 mg Oral DAILY    FLUoxetine (PROzac) capsule 40 mg  40 mg Oral DAILY    budesonide-formoterol (SYMBICORT) 160-4.5 mcg/actuation HFA inhaler 2 Puff  2 Puff Inhalation BID RT    albuterol-ipratropium (DUO-NEB) 2.5 MG-0.5 MG/3 ML  3 mL Nebulization Q6H PRN       No intake/output data recorded. No intake/output data recorded. Objective:     Blood pressure (!) 218/127, pulse (!) 102, temperature 98 °F (36.7 °C), temperature source Oral, resp. rate 20.   Temp (24hrs), Av.3 °F (36.8 °C), Min:98 °F (36.7 °C), Max:98.8 °F (37.1 °C)      Blood Pressure: BP: (!) 218/127  Pulse: Pulse (Heart Rate): (!) 102  Temp:  Temp: 98 °F (36.7 °C)    Artificial Kidney Dialyzer/Set Up Inspection: Revaclear   hours     Heparin Bolus Heparin Bolus (units): 4000 units  Blood flow rate Blood Flow Rate (ml/min): 350 ml/min   Dialysate rate     Arterial Access Pressure Arterial Access Pressure (mmHg): -240  Venous Return Pressure Venous Return Pressure (mmHg): 230  Ultrafiltration Rate Goal/Amount of Fluid to Remove (mL): 4000 mL  Fluid Removal    Net Fluid Removal        PHYSICAL EXAM: snoring appears in no distress  HEENT:  Non icteric  CHEST AND LUNGS:  Clear ant/lat  CVS:  Regular no rub  EXT:  Right arm avf     DATA REVIEW:    Labs: Results:       Chemistry Recent Labs     09/25/19  1133   GLU 97      K 5.2      CO2 25   *   CREA 12.30*   CA 6.9*  7.4*   AGAP 11   BUCR 8*   ALB 3.6      CBC w/Diff Recent Labs     09/25/19  1133   HGB 9.1*   HCT 27.8*      Coagulation No results for input(s): PTP, INR, APTT, INREXT in the last 72 hours. Iron/Ferritin Recent Labs     09/25/19  1133   IRON 102      BNP No results for input(s): BNPP in the last 72 hours. Cardiac Enzymes No results for input(s): CPK, CKND1, KAJAL in the last 72 hours. No lab exists for component: CKRMB, TROIP   Liver Enzymes Recent Labs     09/25/19  1133   ALB 3.6      Thyroid Studies No results found for: T4, T3U, TSH, TSHEXT     IPTH 1,072    Images:  XR Results (maximum last 3): No results found for this or any previous visit. CT Results (maximum last 3): No results found for this or any previous visit. CULTURE:   )No results for input(s): SDES, CULT in the last 72 hours. No results for input(s): CULT in the last 72 hours. Assessment/Plan:     End Stage Renal Disease:  Patient is tolerating dialysis treatment well. .  Additionally the patient has experienced normal dialysis treatment during dialysis. Dry weight   same. Will benefit from more aggressive HD for clearance and to help control BP. Will sched for HD again tomorrow if he agrees.     At 5:15 PM on 9/25/2019, I saw and examined patient during hemodialysis treatment. The patient was receiving hemodialysis for treatment of end stage renal disease. I have also reviewed vital signs, intake and output, lab results and recent events, and agreed with today's dialysis order. Anemia:  Hold Epo until BP is undercontrol. Renal Metabolic Bone Disease:  Phoslo with meals and IV hectorol /HD                      Hypertension: uncontrolled - medication changes/orders.  Adjust meds, inc UF,     Access: Fistula adequate monitoring/no changes         Kirill Lopez MD  9/25/2019

## 2019-09-26 LAB
ANION GAP SERPL CALC-SCNC: 9 MMOL/L (ref 3–18)
BUN SERPL-MCNC: 46 MG/DL (ref 7–18)
BUN/CREAT SERPL: 6 (ref 12–20)
CALCIUM SERPL-MCNC: 8.5 MG/DL (ref 8.5–10.1)
CHLORIDE SERPL-SCNC: 98 MMOL/L (ref 100–111)
CO2 SERPL-SCNC: 28 MMOL/L (ref 21–32)
CREAT SERPL-MCNC: 7.77 MG/DL (ref 0.6–1.3)
GLUCOSE BLD STRIP.AUTO-MCNC: 131 MG/DL (ref 70–110)
GLUCOSE BLD STRIP.AUTO-MCNC: 93 MG/DL (ref 70–110)
GLUCOSE SERPL-MCNC: 104 MG/DL (ref 74–99)
POTASSIUM SERPL-SCNC: 4.7 MMOL/L (ref 3.5–5.5)
SODIUM SERPL-SCNC: 135 MMOL/L (ref 136–145)

## 2019-09-26 PROCEDURE — 74011250637 HC RX REV CODE- 250/637: Performed by: PSYCHIATRY & NEUROLOGY

## 2019-09-26 PROCEDURE — 74011250637 HC RX REV CODE- 250/637: Performed by: INTERNAL MEDICINE

## 2019-09-26 PROCEDURE — 94660 CPAP INITIATION&MGMT: CPT

## 2019-09-26 PROCEDURE — 80048 BASIC METABOLIC PNL TOTAL CA: CPT

## 2019-09-26 PROCEDURE — 90935 HEMODIALYSIS ONE EVALUATION: CPT

## 2019-09-26 PROCEDURE — 65220000003 HC RM SEMIPRIVATE PSYCH

## 2019-09-26 PROCEDURE — 74011250636 HC RX REV CODE- 250/636

## 2019-09-26 PROCEDURE — 36415 COLL VENOUS BLD VENIPUNCTURE: CPT

## 2019-09-26 PROCEDURE — 74011250636 HC RX REV CODE- 250/636: Performed by: INTERNAL MEDICINE

## 2019-09-26 PROCEDURE — 82962 GLUCOSE BLOOD TEST: CPT

## 2019-09-26 RX ORDER — DEXTROSE 50 % IN WATER (D50W) INTRAVENOUS SYRINGE
25-50 AS NEEDED
Status: DISCONTINUED | OUTPATIENT
Start: 2019-09-26 | End: 2019-09-30 | Stop reason: HOSPADM

## 2019-09-26 RX ORDER — DIPHENHYDRAMINE HYDROCHLORIDE 50 MG/ML
12.5 INJECTION, SOLUTION INTRAMUSCULAR; INTRAVENOUS ONCE
Status: COMPLETED | OUTPATIENT
Start: 2019-09-26 | End: 2019-09-26

## 2019-09-26 RX ORDER — INSULIN LISPRO 100 [IU]/ML
INJECTION, SOLUTION INTRAVENOUS; SUBCUTANEOUS
Status: DISCONTINUED | OUTPATIENT
Start: 2019-09-26 | End: 2019-09-30 | Stop reason: HOSPADM

## 2019-09-26 RX ORDER — HEPARIN SODIUM 1000 [USP'U]/ML
5000 INJECTION, SOLUTION INTRAVENOUS; SUBCUTANEOUS AS NEEDED
Status: DISCONTINUED | OUTPATIENT
Start: 2019-09-26 | End: 2019-09-26

## 2019-09-26 RX ORDER — DIPHENHYDRAMINE HYDROCHLORIDE 50 MG/ML
INJECTION, SOLUTION INTRAMUSCULAR; INTRAVENOUS
Status: COMPLETED
Start: 2019-09-26 | End: 2019-09-26

## 2019-09-26 RX ORDER — HEPARIN SODIUM 1000 [USP'U]/ML
5000 INJECTION, SOLUTION INTRAVENOUS; SUBCUTANEOUS
Status: DISCONTINUED | OUTPATIENT
Start: 2019-09-26 | End: 2019-09-30 | Stop reason: HOSPADM

## 2019-09-26 RX ORDER — MAGNESIUM SULFATE 100 %
4 CRYSTALS MISCELLANEOUS AS NEEDED
Status: DISCONTINUED | OUTPATIENT
Start: 2019-09-26 | End: 2019-09-30 | Stop reason: HOSPADM

## 2019-09-26 RX ORDER — HEPARIN SODIUM 5000 [USP'U]/ML
4000 INJECTION, SOLUTION INTRAVENOUS; SUBCUTANEOUS ONCE
Status: COMPLETED | OUTPATIENT
Start: 2019-09-26 | End: 2019-09-26

## 2019-09-26 RX ADMIN — HYDROXYZINE PAMOATE 50 MG: 50 CAPSULE ORAL at 16:05

## 2019-09-26 RX ADMIN — ACETAMINOPHEN 500 MG: 500 TABLET ORAL at 02:03

## 2019-09-26 RX ADMIN — BUDESONIDE AND FORMOTEROL FUMARATE DIHYDRATE 2 PUFF: 160; 4.5 AEROSOL RESPIRATORY (INHALATION) at 21:13

## 2019-09-26 RX ADMIN — HYDRALAZINE HYDROCHLORIDE 100 MG: 50 TABLET, FILM COATED ORAL at 21:13

## 2019-09-26 RX ADMIN — HYDROXYZINE PAMOATE 50 MG: 50 CAPSULE ORAL at 06:05

## 2019-09-26 RX ADMIN — TRAZODONE HYDROCHLORIDE 50 MG: 50 TABLET ORAL at 21:17

## 2019-09-26 RX ADMIN — TRAMADOL HYDROCHLORIDE AND ACETAMINOPHEN 1 TABLET: 37.5; 325 TABLET, FILM COATED ORAL at 21:19

## 2019-09-26 RX ADMIN — BUDESONIDE AND FORMOTEROL FUMARATE DIHYDRATE 2 PUFF: 160; 4.5 AEROSOL RESPIRATORY (INHALATION) at 09:12

## 2019-09-26 RX ADMIN — DIPHENHYDRAMINE HYDROCHLORIDE 12.5 MG: 50 INJECTION, SOLUTION INTRAMUSCULAR; INTRAVENOUS at 12:23

## 2019-09-26 RX ADMIN — FLUOXETINE 40 MG: 20 CAPSULE ORAL at 08:32

## 2019-09-26 RX ADMIN — AMLODIPINE BESYLATE 10 MG: 5 TABLET ORAL at 08:31

## 2019-09-26 RX ADMIN — DIPHENHYDRAMINE HYDROCHLORIDE 12.5 MG: 50 INJECTION INTRAMUSCULAR; INTRAVENOUS at 12:23

## 2019-09-26 RX ADMIN — CALCIUM ACETATE 2001 MG: 667 CAPSULE ORAL at 08:31

## 2019-09-26 RX ADMIN — HYDRALAZINE HYDROCHLORIDE 100 MG: 50 TABLET, FILM COATED ORAL at 08:32

## 2019-09-26 RX ADMIN — TRAMADOL HYDROCHLORIDE AND ACETAMINOPHEN 1 TABLET: 37.5; 325 TABLET, FILM COATED ORAL at 10:00

## 2019-09-26 RX ADMIN — HEPARIN SODIUM 4000 UNITS: 5000 INJECTION INTRAVENOUS; SUBCUTANEOUS at 10:20

## 2019-09-26 RX ADMIN — HYDRALAZINE HYDROCHLORIDE 100 MG: 50 TABLET, FILM COATED ORAL at 16:05

## 2019-09-26 RX ADMIN — TRAMADOL HYDROCHLORIDE AND ACETAMINOPHEN 1 TABLET: 37.5; 325 TABLET, FILM COATED ORAL at 16:05

## 2019-09-26 RX ADMIN — CALCIUM ACETATE 2001 MG: 667 CAPSULE ORAL at 16:56

## 2019-09-26 RX ADMIN — HYDROXYZINE PAMOATE 50 MG: 50 CAPSULE ORAL at 09:17

## 2019-09-26 RX ADMIN — HYDROXYZINE PAMOATE 50 MG: 50 CAPSULE ORAL at 01:55

## 2019-09-26 RX ADMIN — HYDROXYZINE PAMOATE 50 MG: 50 CAPSULE ORAL at 21:13

## 2019-09-26 NOTE — BH NOTES
Pt in dialysis much of shift; mood and affect fair; cooperative and compliant, insight into illness fair, focused on (subj) \"finding a good place to live\", interaction good, no behavioral issues during shift; sleep, appetite and hygiene wnl. Denies SI, HI,and AVH; pt involved in no falls this shift - skid resistant footwear utilized and floor kept free of items that could precipitate a fall.

## 2019-09-26 NOTE — PROGRESS NOTES
Hemodialysis Rounding Note      Patient: Aries Bernard               Sex: male          DOA: 2019  5:29 PM        YOB: 1970      Age:  54 y.o.        LOS:  LOS: 2 days     Subjective:     Aries Bernard is a 54 y.o.  who presents with Major depressive disorder [F32.9]. The patient is dialyzing utilizing the following method:Intermittent Hemodialysis        Complaints: Feels great    Current Facility-Administered Medications   Medication Dose Route Frequency    traMADol-acetaminophen (ULTRACET) per tablet 1 Tab  1 Tab Oral Q6H PRN    0.9% sodium chloride infusion  100 mL/hr IntraVENous DIALYSIS PRN    doxercalciferol (HECTOROL) 4 mcg/2 mL injection 3 mcg  3 mcg IntraVENous DIALYSIS MON, WED & FRI    heparin (porcine) injection 4,000 Units  4,000 Units IntraVENous DIALYSIS MON, WED & FRI    calcium acetate (PHOSLO) capsule 2,001 mg  3 Cap Oral TID WITH MEALS    hydrALAZINE (APRESOLINE) tablet 100 mg  100 mg Oral TID    hydrOXYzine pamoate (VISTARIL) capsule 50 mg  50 mg Oral Q4H PRN    LORazepam (ATIVAN) injection 1-2 mg  1-2 mg IntraMUSCular Q4H PRN    traZODone (DESYREL) tablet 50 mg  50 mg Oral QHS PRN    acetaminophen (TYLENOL) tablet 500 mg  500 mg Oral Q6H PRN    amLODIPine (NORVASC) tablet 10 mg  10 mg Oral DAILY    FLUoxetine (PROzac) capsule 40 mg  40 mg Oral DAILY    budesonide-formoterol (SYMBICORT) 160-4.5 mcg/actuation HFA inhaler 2 Puff  2 Puff Inhalation BID RT    albuterol-ipratropium (DUO-NEB) 2.5 MG-0.5 MG/3 ML  3 mL Nebulization Q6H PRN       No intake/output data recorded.  1901 -  0700  In: -   Out: 4000       Objective:     Blood pressure (!) 186/94, pulse 100, temperature 98 °F (36.7 °C), resp. rate 16.   Temp (24hrs), Av.1 °F (36.7 °C), Min:98 °F (36.7 °C), Max:98.4 °F (36.9 °C)      Blood Pressure: BP: (!) 186/94  Pulse: Pulse (Heart Rate): 100  Temp:  Temp: 98 °F (36.7 °C)    Artificial Kidney Dialyzer/Set Up Per Dr Samuels ekg showed pt is back in sinus rhytmn. Keep appt with PCP tomorrow, take along inhaler so they can help show how it is used.   Inspection: Revaclear   hours Duration of Treatment (hours): 4 hours   Heparin Bolus Heparin Bolus (units): 4000 units  Blood flow rate Blood Flow Rate (ml/min): 0 ml/min   Dialysate rate     Arterial Access Pressure Arterial Access Pressure (mmHg): 0  Venous Return Pressure Venous Return Pressure (mmHg): 0  Ultrafiltration Rate Goal/Amount of Fluid to Remove (mL): 4000 mL  Fluid Removal Fluid Removed (mL): 4500  Net Fluid Removal NET Fluid Removed (mL): 4000 ml      PHYSICAL EXAM: alert, oriented x 3 afebrile,  appears in no distress  HEENT:  Non icteric  CHEST AND LUNGS:  Clear ant/lat  CVS:  Regular no rub  EXT:  Right arm avf     DATA REVIEW:    Labs: Results:       Chemistry Recent Labs     09/26/19  0615 09/25/19  1133   * 97   * 137   K 4.7 5.2   CL 98* 101   CO2 28 25   BUN 46* 101*   CREA 7.77* 12.30*   CA 8.5 6.9*  7.4*   AGAP 9 11   BUCR 6* 8*   ALB  --  3.6      CBC w/Diff Recent Labs     09/25/19  1133   HGB 9.1*   HCT 27.8*      Coagulation No results for input(s): PTP, INR, APTT, INREXT, INREXT in the last 72 hours. Iron/Ferritin Recent Labs     09/25/19  1133   IRON 102      BNP No results for input(s): BNPP in the last 72 hours. Cardiac Enzymes No results for input(s): CPK, CKND1, KAJAL in the last 72 hours. No lab exists for component: CKRMB, TROIP   Liver Enzymes Recent Labs     09/25/19  1133   ALB 3.6      Thyroid Studies No results found for: T4, T3U, TSH, TSHEXT, TSHEXT     IPTH 1,072    Images:  XR Results (maximum last 3): No results found for this or any previous visit. CT Results (maximum last 3): No results found for this or any previous visit. CULTURE:   )No results for input(s): SDES, CULT in the last 72 hours. No results for input(s): CULT in the last 72 hours. Assessment/Plan:     End Stage Renal Disease:  Patient is tolerating dialysis treatment well. .  Additionally the patient has experienced normal dialysis treatment during dialysis.   Dry weight same.  Will cont with more aggressive HD for clearance and to help control BP. Will sched for HD again tomorrow , he understands and is agreeable    At 11:02 AM on 9/26/2019, I saw and examined patient during hemodialysis treatment. The patient was receiving hemodialysis for treatment of end stage renal disease. I have also reviewed vital signs, intake and output, lab results and recent events, and agreed with today's dialysis order. Anemia:  Hold Epo until BP is undercontrol. Renal Metabolic Bone Disease:  Phoslo with meals and IV hectorol /HD 3x a week                      Hypertension: Improving,  Cont same meds, trend with more UF, pt does not want to take more BP meds.     Access: Fistula adequate monitoring/no changes         Migue Guerrero MD  9/26/2019

## 2019-09-26 NOTE — BH NOTES
Patient was received back on the unit in  Stable condition. He arrived on unit via stretcher accompanied by transport. He is alert and orientated to time, place and situation. He is in stable condition. He ate 100% of his  dinner. He was pleasant, talkative and medication compliant. Continuing to monitor and continuing to provide support.

## 2019-09-26 NOTE — BH NOTES
Pt threatened to fuck up staff because the other patients took most of the snacks the snacks. When staff told patient more could be brought out \"You should have brought out enough for everyone. I will fuck you up. When staff brought out more snacks pt took ten peanut butter, jelly  And two hand full of crackers, staff reminded pt of too much sugar, pt replied fuck you . Staff informed patients he should not get upset over snacks and it is not even snack time. Patient continued to curse at staff.

## 2019-09-26 NOTE — DIALYSIS
ACUTE HEMODIALYSIS FLOW SHEET    HEMODIALYSIS ORDERS: Physician: Dr. Hema Tran     Dialyzer: Revaclear   Duration: 4 hr  BFR: 400   DFR: 800   Dialysate:  Temp 37   K+   3    Ca+  3   Na 138   Bicarb 35   Wt Readings from Last 1 Encounters:   No data found for Wt    Patient Chart [x]   Unable to Obtain []  Dry weight/UF Goal: 4000 ml  Access RUE AVF     Heparin [x]  Bolus    Units    [] Hourly    Units    []None      Catheter locking solution    Pre BP:   186/94    Pulse:  100   Respirations: 20    Temperature:  98    Tx: NSS   ml/Bolus   [x] N/A   Labs: []  Pre  []  Post:   [x] N/A   Additional Orders(medications, blood products, hypotension management): [x] Yes   [] No     [x]  [facility] Consent Verified     CATHETER ACCESS:  [x]N/A   []Right   []Left   []IJ     []Fem   []Chest wall   [] First use X-ray verified     []Tunnel    [] Non Tunneled   []No S/S infection  []Redness  []Drainage []Cultured []Swelling []Pain   []Medical Aseptic Prep Utilized   []Dressing Changed  [] Biopatch  Date:    []Clotted   []Patent   Flows: []Good  []Poor  []Reversed   If access problem,  notified: []Yes    []N/A        GRAFT/FISTULA ACCESS:   []N/A     [x]Right     []Left     [x]UE     []LE   []AVG   [x]AVF     []Buttonhole    [x]Medical Aseptic Prep Utilized   [x]No S/S infection  []Redness  []Drainage []Cultured  [] Swelling  [] Pain  Bruit:   [x] Strong    [] Weak       Thrill :   [x] Strong    [] Weak     Needle Gauge: 15   Length: 1 inch   If access problem,  notified: []Yes     [x]N/A     Please describe access if present and not used: N/A       GENERAL ASSESSMENT:    LUNGS:  Rate 20   SaO2%      [] Clear  [] Coarse  [] Crackles  [] Wheezing                                                [x] Diminished     Location : []RLL   []LLL    []RUL  []SOFI   Cough: []Productive  []Dry  [x]N/A   Respirations:  [x]Easy  []Labored   Therapy:  []RA  []NC  l/min    Mask: []NRB  [] Venti    O2%                  []Ventilator []Intubated  [] Trach  [] BiPaP   CARDIAC: [x]Regular      [] Irregular   [] Pericardial Rub  [] JVD          []  Monitored  [] Bedside  [] Remotely monitored     EDEMA: [] None  [x]Generalized  [] Pitting [] 1    [] 2    [] 3    [] 4                 [] Facial  [] Pedal  []  UE  [] LE   SKIN:   [x] Warm  [] Hot     [] Cold   [x] Dry     [] Pale   [] Diaphoretic                  [] Flushed  [] Jaundiced  [] Cyanotic  [] Rash  [] Weeping   LOC:    [x] Alert      [x]Oriented:    [x] Person     [x] Place  [x]Time               [] Confused  [] Lethargic  [] Medicated  [] Non-responsive   GI / ABDOMEN:                     [] Flat    [] Distended    [x] Soft    [] Firm   []  Obese                   [] Diarrhea  [x] Bowel Sounds  [] Nausea  [] Vomiting     / URINE ASSESSMENT:                   [] Voiding   [x] Oliguria  [] Anuria   []  Cervantes                  [] Incontinent  []  Incontinent Brief []  Bathroom Privileges     PAIN:  [x] 0 []1  []2   []3   []4   []5   []6   []7   []8   []9   []10            Scale 0-10  Action/Follow Up:    MOBILITY:  [x] Bed    [] Stretcher      All Vitals and Treatment Details on Attached [address]: [facility]          Room # 121/01   [] 1st Time Acute      [] Stat       [x] Routine      [] Urgent     [x] Acute Room  []  Bedside  [] ICU/CCU  [] ER   Isolation Precautions:  [x] Dialysis    There are currently no Active Isolations       ALLERGIES:     Allergies   Allergen Reactions    Carvedilol Hives    Mushroom Other (comments)     Throat closure and sever tongue edema      Code Status:  No Order     Hepatitis Status   2nd RN check :  RL   9/13/19 Neg/Yvonne per chronic clinic report     Current Labs:      Lab Results   Component Value Date/Time    HGB 9.1 (L) 09/25/2019 11:33 AM    HCT 27.8 (L) 09/25/2019 11:33 AM     Lab Results   Component Value Date/Time    Sodium 135 (L) 09/26/2019 06:15 AM    Potassium 4.7 09/26/2019 06:15 AM    Chloride 98 (L) 09/26/2019 06:15 AM    CO2 28 09/26/2019 06:15 AM    Anion gap 9 09/26/2019 06:15 AM    Glucose 104 (H) 09/26/2019 06:15 AM    BUN 46 (H) 09/26/2019 06:15 AM    Creatinine 7.77 (H) 09/26/2019 06:15 AM    BUN/Creatinine ratio 6 (L) 09/26/2019 06:15 AM    GFR est AA 9 (L) 09/26/2019 06:15 AM    GFR est non-AA 8 (L) 09/26/2019 06:15 AM    Calcium 8.5 09/26/2019 06:15 AM          DIET:  DIET RENAL      PRIMARY NURSE REPORT:   Pre Dialysis: ESPERANZA Hurley RN     Time: 0900        EDUCATION:    [x] Patient [] Other           Knowledge Basis: []None [x]Minimal [] Substantial   Barriers to learning  [x]N/A   [] Access Care     [] S&S of infection  [] Fluid Management  [] K+ [x] Procedural    []Albumin   [] Medications   [] Tx Options   [] Transplant   [] Diet   [] Other   Teaching Tools:  [x] Explain  [] Demo  [] Handouts [] Video  Patient response: [x] Verbalized understanding  [] Teach back  [] Return demonstration   [] Requires follow up      [x]Time Out/Safety Check  [x] Extracorporeal Circuit Tested for integrity       RO/HEMODIALYSIS MACHINE SAFETY CHECKS  Before each treatment:     Machine Number:                   [address] [facility]                                     [x] Unit Machine # 7 with centralized RO                                                                              Alarm Test:  Pass time 0930            [x] RO/Machine Log Complete    Machine Temp    37             Dialysate: pH  7.4   Conductivity: Meter 13.8    HD Machine  13.9    TCD: 13.8  Dialyzer Lot # [id number]    Blood Tubing Lot # [id number]    Saline Lot # [id number]     CHLORINE TESTING-Before each treatment and every 4 hours    Total Chlorine: [x] less than 0.1 ppm  Initial Time Check: 090       4 Hr/2nd Check Time: 1300   (if greater than 0.1 ppm from Primary then every 30 minutes from Secondary)     TREATMENT INITIATION  with Dialysis Precautions:   [x] All Connections Secured              [x] Saline Line Double Clamped   [x] Venous Parameters Set               [x] Arterial Parameters Set    [x] Prime Given 250ml NSS              [x]Air Foam Detector Engaged      Treatment Initiation Note:  0930  Pt received to dialysis unit via stretcher, awake and cooperative, on room air. 0945  RUE UVF cannulated without difficulty and dialysis initiated. During Treatment Notes:  1000 Vascular access visible with arterial and venous line connections intact. 1100  Vascular access visible with arterial and venous line connections intact. 1200  Vascular access visible with arterial and venous line connections intact. 1300  Vascular access visible with arterial and venous line connections intact. Medication Dose Volume Route Time [facility] Name Title   Heparin  4000  HD 0945 Josué Right RN   Benedryl 12.5 mg   [facility] [address] RN     Post Assessment  Dialyzer Cleared:[] Good [x] Fair  [] Poor  Blood processed:  60.2 L  UF Removed:  3000 Ml  Post Wt: 107.5  kg  Post BP:158/76 Pulse:106 Resp:20 Temp:98.2 Lungs: [] Clear [] Course  [x] Crackles                 [] Wheezing [] Diminished   Post Tx Vascular Access: [] N/A  AVF/AVG: Bleeding stopped with  Arterial Pressure for 10 min. Venous Pressure for 10 min      Cardiac:[x] Regular   [] Irregular   Rhythm:  [] Monitored   [] Not Monitored    CVC Catheter: [x] N/A     Edema:  [] None  [x] General [] Facial    [] Pedal   [] UE [] LE   Skin:[x] Warm  [x] Dry [] Diaphoretic               [] Flushed  [] Pale [] Cyanotic   Pain:  [x]0  []1 []2  []3 []4  []5  []6 []7 []8  []9  []10       Post Treatment Note:   1330 Pt requesting to be removed from dialysis. Extremely restless and beginning to become agitated. Dr Katt Mathias called and order received to terminate dialysis. 1345  Dialysis cannulas removed and pressure applied until both sites clean and dry. Dry dressing applied. POST TREATMENT PRIMARY NURSE HANDOFF REPORT:   Post Dialysis: ESPERANZA Puga RN                Time:  1345       Abbreviations: AVG-arterial venous graft, AVF-arterial venous fistula, IJ-Internal Jugular, Subcl-Subclavian, Fem-Femoral, Tx-treatment, AP/HR-apical heart rate, DFR-dialysate flow rate, BFR-blood flow rate, AP-arterial pressure, -venous pressure, UF-ultrafiltrate, TMP-transmembrane pressure, Jerry-Venous, Art-Arterial, RO-Reverse Osmosis

## 2019-09-26 NOTE — BH NOTES
Pt requested and received tylenol 500 mg.po and vistaril 50 mg. Po . He was able to return to sleep.

## 2019-09-26 NOTE — BSMART NOTE
OCCUPATIONAL THERAPY PROGRESS NOTE Group [id number] The patient declined group. Observed interacting socially with peers before group. Said he was tired from dialysis and wanted to shower before dinner.

## 2019-09-26 NOTE — PROGRESS NOTES
NUTRITION    Nutrition Consult: Diet Education    RECOMMENDATIONS / PLAN:     - Add nutrition supplement Nepro once daily  - Add double vegetable and protein portion  - Continue 1500 mL FR per nephrology. - Continue RD inpatient monitoring and evaluation. NUTRITION DIAGNOSIS & INTERVENTIONS:     - Meals/snacks: modify composition  - Medical food supplement therapy: initiate  - Nutrition education: Renal Diet and 1500 mL FR 9/26/19    Nutrition Diagnosis: Increased nutrient needs (protein) related to increased energy expenditure as evidenced by patient with ESRD on dialysis. Food and nutrition related knowledge deficit related to ESRD on dialysis as evidenced by patient reporting limited knowledge of Renal diet appropriate food choices      ASSESSMENT:     Provided diet education and handouts to patient for Renal diet and fluid restriction. Pt receptive to information provided and expressed willingness to make changes to current diet. Pt reports good appetite, eating well and asking for more food, agreeable to double protein and vegetable portion. Tolerating diet. Nutritional intake adequate to meet patients estimated nutritional needs:  No    Diet: DIET RENAL Regular; FR 1500ML    Food Allergies: Mushroom  Current Appetite: Good  Appetite/meal intake prior to admission: Good  Feeding Limitations:  [] Swallowing Difficulty       [] Chewing Difficulty       [] Other   Current Meal Intake: No data found. Gastrointestinal Issues:  [] Yes    [x] No: none known  Skin Integrity:  WDL    Pertinent Medications:  Reviewed: phoslo   Labs:  Reviewed     Anthropometrics:  Ht Readings from Last 1 Encounters:   No data found for Ht       There were no vitals filed for this visit. There is no height or weight on file to calculate BMI.  32.62 kg/m^2 using pt stated height and weight    Weight History:   No flowsheet data found.     Admitting Diagnosis: Major depressive disorder [F32.9]  No past medical history on file.     Education Needs:        [] None identified  [] Identified - Not appropriate at this time  [x]  Identified and addressed - refer to education log  Learning Limitations:   [x] None identified  [] Identified    Cultural, Catholic & ethnic food preferences identified:  [x] None    [] Yes      ESTIMATED NUTRITION NEEDS:     6852-9718 kcal (30-35 kcal/kg), 109-137 gm protein (1.2-1.5 gm/kg), 1500 mL/day  Based on:       [] Actual BW      [x] SBW 91 kg  Needs calculated using patient stated height of 72\"         MONITORING & EVALUATION:     Nutrition Goal(s):   - PO nutrition intake will meet >75% of patient estimated nutritional needs within the next 7 days. Outcome: New/Initial goal    - Patient will increase knowledge of appropriate food choices on a Renal diet within 7 days.   Outcome:  [] Met/Ongoing    [] Progressing towards goal    [] Not progressing towards goal    [x] New/Initial goal     Monitor:  [x] Food and beverage intake   [x] Diet order   [x] Nutrition-focused physical findings   [] Weight      Previous Recommendations (for follow-up assessments only):    Not Applicable      Discharge Planning: Renal Diet   [x]  Participated in care planning, discharge planning, & interdisciplinary rounds as appropriate      Elis Quiroz RD   Pager: [phone]

## 2019-09-26 NOTE — PROGRESS NOTES
Problem: Falls - Risk of  Goal: *Absence of Falls  Description  Patient will remain free of falls each shift daily through hospital stay   Outcome: Progressing Towards Goal  Note:   Fall Risk Interventions:   Patient will not experience any falls this shift. Medication Interventions: Teach patient to arise slowly                   Problem: Suicide  Goal: *STG: Remains safe in hospital  Description  Patient will be free from injurious behavior through hospital stay   Outcome: Progressing Towards Goal  Note:   Patient will remain free from harm during hospital admission. Goal: *STG: Attends activities and groups  Description  Patient will attend and participate in at least 3 of 4 groups and activities daily through hospital stay   9/26/2019 1831 by Eduar Ceron  Outcome: Progressing Towards Goal  Note:   Patient will participate in at least 2-3 groups daily. 9/26/2019 1743 by Yolanda VILLATORO  Outcome: Progressing Towards Goal  Note:   Patient will participate in at least 2-3 groups. Problem: Nutrition Deficit  Goal: *Optimize nutritional status  Description  Po intake of meals will meet >75% of patient estimated nutritional needs within the next 5-7 days. Patient will increase knowledge of appropriate food choices on a Renal diet within 7 days. .   Outcome: Progressing Towards Goal  Note:   Patient will consume at least 75% of his meal daily. Problem: Falls - Risk of  Goal: *Absence of Falls  Description  Patient will remain free of falls each shift daily through hospital stay   Outcome: Progressing Towards Goal  Note:   Fall Risk Interventions:   Patient will not experience any falls this shift.          Medication Interventions: Teach patient to arise slowly                   Problem: Suicide  Goal: *STG: Remains safe in hospital  Description  Patient will be free from injurious behavior through hospital stay   Outcome: Progressing Towards Goal  Note:   Patient will remain free from harm during hospital admission. Goal: *STG: Attends activities and groups  Description  Patient will attend and participate in at least 3 of 4 groups and activities daily through hospital stay   9/26/2019 1831 by Eduar Ceron  Outcome: Progressing Towards Goal  Note:   Patient will participate in at least 2-3 groups daily. 9/26/2019 1743 by Yolanda VILLATORO  Outcome: Progressing Towards Goal  Note:   Patient will participate in at least 2-3 groups. Problem: Nutrition Deficit  Goal: *Optimize nutritional status  Description  Po intake of meals will meet >75% of patient estimated nutritional needs within the next 5-7 days. Patient will increase knowledge of appropriate food choices on a Renal diet within 7 days. .   Outcome: Progressing Towards Goal  Note:   Patient will consume at least 75% of his meal daily. Patient affect and mood appear appropriate for most of the shift. Patient displayed some aggression and agitation during snacks when asked by staff to be considerate of his peers. Patient has been in the day area most of the day conversing with peers. He has been participating in groups on the unit. He has been compliant with scheduled medications. Patient has not voiced any thoughts of self harm. He is monitored every 15 minutes for safety and therapeutic support.

## 2019-09-26 NOTE — BSMART NOTE
SW assessment/Intervention:  Patient has been unavailable for the majority of the shift due to dialysis. SW submitted application for [employer] to assist with housing as well as [employer]. SW will continue to assist patient as needed.  
 
Lizzie Johansen, LCSW-E

## 2019-09-27 LAB
ANION GAP SERPL CALC-SCNC: 8 MMOL/L (ref 3–18)
BUN SERPL-MCNC: 50 MG/DL (ref 7–18)
BUN/CREAT SERPL: 6 (ref 12–20)
CALCIUM SERPL-MCNC: 8.7 MG/DL (ref 8.5–10.1)
CHLORIDE SERPL-SCNC: 100 MMOL/L (ref 100–111)
CO2 SERPL-SCNC: 26 MMOL/L (ref 21–32)
CREAT SERPL-MCNC: 7.92 MG/DL (ref 0.6–1.3)
GLUCOSE BLD STRIP.AUTO-MCNC: 119 MG/DL (ref 70–110)
GLUCOSE BLD STRIP.AUTO-MCNC: 147 MG/DL (ref 70–110)
GLUCOSE BLD STRIP.AUTO-MCNC: 152 MG/DL (ref 70–110)
GLUCOSE SERPL-MCNC: 101 MG/DL (ref 74–99)
POTASSIUM SERPL-SCNC: 4.5 MMOL/L (ref 3.5–5.5)
SODIUM SERPL-SCNC: 134 MMOL/L (ref 136–145)

## 2019-09-27 PROCEDURE — 74011250637 HC RX REV CODE- 250/637: Performed by: PSYCHIATRY & NEUROLOGY

## 2019-09-27 PROCEDURE — 82962 GLUCOSE BLOOD TEST: CPT

## 2019-09-27 PROCEDURE — 65220000003 HC RM SEMIPRIVATE PSYCH

## 2019-09-27 PROCEDURE — 94660 CPAP INITIATION&MGMT: CPT

## 2019-09-27 PROCEDURE — 74011250637 HC RX REV CODE- 250/637: Performed by: INTERNAL MEDICINE

## 2019-09-27 PROCEDURE — 80048 BASIC METABOLIC PNL TOTAL CA: CPT

## 2019-09-27 PROCEDURE — 36415 COLL VENOUS BLD VENIPUNCTURE: CPT

## 2019-09-27 PROCEDURE — 90935 HEMODIALYSIS ONE EVALUATION: CPT

## 2019-09-27 PROCEDURE — 74011250636 HC RX REV CODE- 250/636: Performed by: INTERNAL MEDICINE

## 2019-09-27 RX ORDER — QUETIAPINE FUMARATE 300 MG/1
300 TABLET, FILM COATED ORAL
Status: DISCONTINUED | OUTPATIENT
Start: 2019-09-27 | End: 2019-09-30 | Stop reason: HOSPADM

## 2019-09-27 RX ADMIN — CALCIUM ACETATE 2001 MG: 667 CAPSULE ORAL at 16:48

## 2019-09-27 RX ADMIN — TRAMADOL HYDROCHLORIDE AND ACETAMINOPHEN 1 TABLET: 37.5; 325 TABLET, FILM COATED ORAL at 08:13

## 2019-09-27 RX ADMIN — CALCIUM ACETATE 2001 MG: 667 CAPSULE ORAL at 08:14

## 2019-09-27 RX ADMIN — CALCIUM ACETATE 2001 MG: 667 CAPSULE ORAL at 15:15

## 2019-09-27 RX ADMIN — BUDESONIDE AND FORMOTEROL FUMARATE DIHYDRATE 2 PUFF: 160; 4.5 AEROSOL RESPIRATORY (INHALATION) at 19:45

## 2019-09-27 RX ADMIN — HYDRALAZINE HYDROCHLORIDE 100 MG: 50 TABLET, FILM COATED ORAL at 22:06

## 2019-09-27 RX ADMIN — DOXERCALCIFEROL 3 MCG: 4 INJECTION, SOLUTION INTRAVENOUS at 12:43

## 2019-09-27 RX ADMIN — QUETIAPINE FUMARATE 300 MG: 300 TABLET ORAL at 22:06

## 2019-09-27 RX ADMIN — HYDROXYZINE PAMOATE 50 MG: 50 CAPSULE ORAL at 08:13

## 2019-09-27 RX ADMIN — TRAMADOL HYDROCHLORIDE AND ACETAMINOPHEN 1 TABLET: 37.5; 325 TABLET, FILM COATED ORAL at 22:06

## 2019-09-27 RX ADMIN — FLUOXETINE 40 MG: 20 CAPSULE ORAL at 08:15

## 2019-09-27 RX ADMIN — HYDRALAZINE HYDROCHLORIDE 100 MG: 50 TABLET, FILM COATED ORAL at 15:16

## 2019-09-27 RX ADMIN — BUDESONIDE AND FORMOTEROL FUMARATE DIHYDRATE 2 PUFF: 160; 4.5 AEROSOL RESPIRATORY (INHALATION) at 08:13

## 2019-09-27 NOTE — DIALYSIS
[facility]        ACUTE HEMODIALYSIS FLOW SHEET      HEMODIALYSIS ORDERS: Physician: Aydee Vanegas     Dialyzer: revaclear   Duration: 4 hr  BFR: 450   DFR: 800   Dialysate:  Temp 36-37*C  K+   3    Ca+  3 Na 138 Bicarb 35   Weight: none   Patient Chart [x]     Unable to Obtain []   Dry weight/UF Goal: 4000 Access   avf  Needle Gauge 15    Heparin [x]  Bolus      5000Units    [] Hourly       Units    [x]None      Catheter locking solution    Pre BP:   203/105    Pulse:     106       Respirations: 16  Temperature:   97.8   Labs: Pre        Post:        [x] N/A   Additional Orders(medications, blood products, hypotension management) [x] N/A     [x] [facility] Consent Verified     CATHETER ACCESS: [x]N/A   []Right   []Left   []IJ     []Fem   [x]chest wall   [] First use X-ray verified     []Tunnel                [] Non Tunneled   []No S/S infection  []Redness  []Drainage []Cultured []Swelling []Pain   []Medical Aseptic Prep Utilized   []Dressing Changed  [] Biopatch  Date:       []Clotted   []Patent   Flows: []Good  []Poor  []Reversed   If access problem,  notified: []Yes    [x]N/A  Date:           GRAFT/FISTULA ACCESS:  []N/A     [x]Right     []Left     [x]UE     []LE   []AVG   [x]AVF        []Buttonhole    [x]Medical Aseptic Prep Utilized   [x]No S/S infection  []Redness  []Drainage []Cultured []Swelling []Pain    Bruit:   [x] Strong    [] Weak       Thrill :   [x] Strong    [] Weak       Needle Gauge: 15  Length: 1   If access problem,  notified: []Yes     [x]N/A  Date:        Please describe access if present and not used:                            GENERAL ASSESSMENT:      LUNGS:  Rate  SaO2% [] N/A    [x] Clear  [] Coarse  [] Crackles  [] Wheezing        [] Diminished     Location : []RLL   []LLL    []RUL  []SOFI     Cough: []Productive  []Dry  [x]N/A   Respirations:  [x]Easy  []Labored     Therapy:   [x]RA  []NC  l/min    Mask: []NRB []Venti       O2%                  []Ventilator  []Intubated  [] Trach  [] BiPaP CARDIAC: [x]Regular      [] Irregular   [] Pericardial Rub  [] JVD        []  Monitored  [] Bedside  [] Remotely monitored [] N/A  Rhythm:      EDEMA: [] None  [x]Generalized  [] Pitting [] 1    [] 2    [] 3    [] 4                 [] Facial  [] Pedal  []  UE  [] LE     SKIN:   [x] Warm  [] Hot     [] Cold   [x] Dry     [] Pale   [] Diaphoretic                  [] Flushed  [] Jaundiced  [] Cyanotic  [] Rash  [] Weeping     LOC:    [x] Alert      [x]Oriented:    [x] Person     [x] Place  [x]Time               [] Confused  [] Lethargic  [] Medicated  [] Non-responsive     GI / ABDOMEN:  [] Flat    [] Distended    [x] Soft    [] Firm   []  Obese                             [] Diarrhea  [x] Bowel Sounds  [] Nausea  [] Vomiting       / URINE ASSESSMENT:[] Voiding   [x] Oliguria  [] Anuria   []  Cervantes     [] Incontinent    []  Incontinent Brief      []  Bathroom Privileges       PAIN: [x] 0 []1  []2   []3   []4   []5   []6   []7   []8   []9   []10              Scale 0-10  Action/Follow Up:      MOBILITY:  [] Amb    [] Amb/Assist    [x] Bed    [] Wheelchair  [] Stretcher      All Vitals and Treatment Details on Attached [address]: [facility]          Room # 121/01      [] 1st Time Acute  [] Stat  [x] Routine  [] Urgent     [x] Acute Room  []  Bedside  [] ICU/CCU  [] ER   Isolation Precautions:   There are currently no Active Isolations      Special Considerations:         [] Blood Consent Verified [x]N/A     ALLERGIES:   Allergies   Allergen Reactions    Carvedilol Hives    Mushroom Other (comments)     Throat closure and sever tongue edema               Code Status:No Order        Hepatitis Status:                        No results found for: HAMAT, HAAB, HABT, HAAT, HBSAG, HBSB, HBSAT, HBABN, HBCM, HBCAB, HBCAT, XBCABS, HBEAB, HBEAG, XHEPCS, [id number], [address] [facility], [facility], HBCLT, HBEBLT, [id number], [id number], [address] [place] [address], [id number], HBCMLT, [id number], HCGAT                  Current Labs:   Lab Results   Component Value Date/Time    Sodium 134 (L) 09/27/2019 06:11 AM    Potassium 4.5 09/27/2019 06:11 AM    Chloride 100 09/27/2019 06:11 AM    CO2 26 09/27/2019 06:11 AM    Anion gap 8 09/27/2019 06:11 AM    Glucose 101 (H) 09/27/2019 06:11 AM    BUN 50 (H) 09/27/2019 06:11 AM    Creatinine 7.92 (H) 09/27/2019 06:11 AM    BUN/Creatinine ratio 6 (L) 09/27/2019 06:11 AM    GFR est AA 9 (L) 09/27/2019 06:11 AM    GFR est non-AA 7 (L) 09/27/2019 06:11 AM    Calcium 8.7 09/27/2019 06:11 AM      Lab Results   Component Value Date/Time    HGB 9.1 (L) 09/25/2019 11:33 AM    HCT 27.8 (L) 09/25/2019 11:33 AM                                                                                     DIET: DIET NUTRITIONAL SUPPLEMENTS  DIET RENAL       PRIMARY NURSE REPORT: First initial/Last name/Title      Pre Dialysis: Cristobal Lima RN     Time: 1000      EDUCATION:    [x] Patient [] Other         Knowledge Basis: []None [x]Minimal [] Substantial   Barriers to learning  [x]N/A   [] Access Care     [] S&S of infection     [] Fluid Management     []K+     [x]Procedural    []Albumin     [] Medications     [] Tx Options     [] Transplant     [] Diet     [] Other   Teaching Tools:  [x] Explain  [] Demo  [] Handouts [] Video  Patient response:   [x] Verbalized understanding  [] Teach back  [] Return demonstration [] Requires follow up   Inappropriate due to            [x] Time Out/Safety Check  [x]Extracorporeal Circuit Tested for integrity       RO/HEMODIALYSIS MACHINE SAFETY CHECKS  Before each treatment:     Machine Number:                   [facility]                                  [x] Unit Machine # 9 with centralized RO                                  [] Portable Machine #1/RO serial # [id number]                                  [] Portable Machine #2/RO serial # [id number]                                  [] Portable Machine #4/RO serial # [id number]                                                     [facility] [] Portable Machine #11/RO serial # [id number]                                   [] Portable Machine #12/RO serial # [id number]                                  [] Portable Machine #13/RO serial #  [id number]      Alarm Test:  Pass time 0900               [x] RO/Machine Log Complete      Temp    36*-37*             Dialysate: pH  7.4 Conductivity: Meter   14     HD Machine   14                  TCD: 14  Dialyzer Lot # [id number]            Blood Tubing Lot # [id number]          Saline Lot #  [id number]     CHLORINE TESTING-Before each treatment and every 4 hours    Total Chlorine: [x] less than 0.1 ppm  Time: 0900 4 Hr/2nd Check Time: 1300   (if greater than 0.1 ppm from Primary then every 30 minutes from Secondary)     TREATMENT INITIATION  with Dialysis Precautions:   [x] All Connections Secured                 [x] Saline Line Double Clamped   [x] Venous Parameters Set                  [x] Arterial Parameters Set    [x] Prime Given 250ml                          [x]Air Foam Detector Engaged      Treatment Initiation Note:Pt in stable condition. AVF accessed and treatment initiated without complication. Dr Shanice Flores at bedside; VO to place pt on 2K/2.5Ca bath and to decrease tx time to 3.5 hours. Post Assessment:   Dialyzer Cleared: [] Good [x] Fair  [] Poor  Blood processed:  69.9 L  UF Removed  3000 Ml  POst BP:   175/97       Pulse: 107        Respirations: 16  Temperature: 98.1 Lungs:     [x] Clear      [] Course         [] Crackles    [] Wheezing         [] Diminished   Post Tx Vascular Access:   AVF/AVG: Bleeding stopped   Art 10 min. Jerry. 10 Min    Cardiac:   [x] Regular   [] Irregular   [] Monitor  [] N/A      Rhythm:       Catheter:   Locking solution: Heparin 1:1000   Art. Jerry.    N/A    Skin:   Pain:    [x] Warm  [x] Dry [] Diaphoretic    [] Flushed    [] Pale [] Cyanotic [x]0  []1  []2   []3  []4   []5   []6   []7   []8   []9   []10     Post Treatment Note:   HD well tolerated. 3L UF removed d/t pt complaints of cramping.   NAD noted during or post treatment       POST TREATMENT PRIMARY NURSE HANDOFF REPORT:     First initial/Last name/Title         Post Dialysis: Nori Chicas RN Time:  0140     Abbreviations: AVG-arterial venous graft, AVF-arterial venous fistula, IJ-Internal Jugular, Subcl-Subclavian, Fem-Femoral, Tx-treatment, AP/HR-apical heart rate, DFR-dialysate flow rate, BFR-blood flow rate, AP-arterial pressure, -venous pressure, UF-ultrafiltrate, TMP-transmembrane pressure, Jerry-Venous, Art-Arterial, RO-Reverse Osmosis

## 2019-09-27 NOTE — BSMART NOTE
ART THERAPY GROUP PROGRESS NOTE Group time:1315 The patient did not awaken/get up when called for group.

## 2019-09-27 NOTE — BH NOTES
Pt in dialysis most of shift; mood and affect fair prior to transport; cooperative and compliant, goal oriented (subj) \"not to get upset over things I can't change\", interaction good; appetite, hygiene and sleep wnl, no behavioral issues during shift. Denies SI, HI and AVH; pt involved in no falls this shift - skid resistant footwear utilized and floor kept free of items that could precipitate a fall.

## 2019-09-27 NOTE — BSMART NOTE
CYNDI assessment/Intervention: Gris Segura is a 54y/o male admitted for suicidal ideation. Pt with ESRD on hemodialysis. He has had dialysis the past two days. No behavior issues. Patient is observed engaged in the milieu. Patient is encouraged to focus on reason for admission and treatment plan. Patient currently denies SI/HI. SW submitted application for MHSS. SW will continue to assist as needed.  
 
KACEY Mcnair

## 2019-09-27 NOTE — PROGRESS NOTES
[address] [facility] [address]  Inpatient Progress Note     Date of Service: 09/27/19  Hospital Day: 3     Subjective/Interval History   09/27/19    Treatment Team Notes:  Notes reviewed and/or discussed and report that Jarocho Gaspar is a 54y/o male admitted for suicidal ideation. Pt with ESRD on hemodialysis. He has had dialysis the past two days. No behavior issues. Patient interview: Jarocho Gaspar was interviewed by this writer today. Pt complains of fatigue otherwise mood is slightly better. He denies SI or hallucinations. Sleep is ok but he requests to be put back on Seroquel which he was taking PTA. He says Trazodone gives him a headache. He denies hallucinations at this time. Supportive therapy provided. Pt allowed to vent his frustration related to health issues and dietary restrictions. Objective     Visit Vitals  BP (!) 171/98 (BP 1 Location: Left arm, BP Patient Position: Sitting)   Pulse (!) 104   Temp 98.6 °F (37 °C)   Resp 16       Recent Results (from the past 24 hour(s))   GLUCOSE, POC    Collection Time: 09/26/19  4:54 PM   Result Value Ref Range    Glucose (POC) 93 70 - 110 mg/dL   GLUCOSE, POC    Collection Time: 09/26/19  7:41 PM   Result Value Ref Range    Glucose (POC) 131 (H) 70 - 258 mg/dL   METABOLIC PANEL, BASIC    Collection Time: 09/27/19  6:11 AM   Result Value Ref Range    Sodium 134 (L) 136 - 145 mmol/L    Potassium 4.5 3.5 - 5.5 mmol/L    Chloride 100 100 - 111 mmol/L    CO2 26 21 - 32 mmol/L    Anion gap 8 3.0 - 18 mmol/L    Glucose 101 (H) 74 - 99 mg/dL    BUN 50 (H) 7.0 - 18 MG/DL    Creatinine 7.92 (H) 0.6 - 1.3 MG/DL    BUN/Creatinine ratio 6 (L) 12 - 20      GFR est AA 9 (L) >60 ml/min/1.73m2    GFR est non-AA 7 (L) >60 ml/min/1.73m2    Calcium 8.7 8.5 - 10.1 MG/DL   GLUCOSE, POC    Collection Time: 09/27/19  6:18 AM   Result Value Ref Range    Glucose (POC) 119 (H) 70 - 110 mg/dL       Mental Status Examination     Appearance/Hygiene 54 y.o.  BLACK OR  male  Hygiene: fair   Behavior/Social Relatedness Appropriate   Musculoskeletal Gait/Station: appropriate  Tone (flaccid, cogwheeling, spastic): not assessed  Psychomotor (hyperkinetic, hypokinetic): calm   Involuntary movements (tics, dyskinesias, akathisa, stereotypies): none   Speech   Rate, rhythm, volume, fluency and articulation are appropriate   Mood   depressed   Affect    congruent   Thought Process Linear and goal directed   Thought Content and Perceptual Disturbances Denies self-injurious behavior (SIB), suicidal ideation (SI), aggressive behavior or homicidal ideation (HI)    Denies auditory and visual hallucinations   Sensorium and Cognition  Grossly intact   Insight  limited   Judgment limited        Assessment/Plan      Psychiatric Diagnoses:   Patient Active Problem List   Diagnosis Code    Recurrent major depressive disorder (HCC) F33.9    Cocaine use disorder, severe, dependence ([facility] [address] F14.20    Cannabis use disorder, severe, dependence ([facility] [address] F12.20    Hypertension secondary to other renal disorders I15.1, N28.89    ESRD (end stage renal disease) on dialysis ([facility] [address] N18.6, Z99.2    Type 2 diabetes mellitus with complication, without long-term current use of insulin ([address] E11.8       Psychosocial and contextual factors: homelessness, drug use    Level of impairment/disability: severe    Janis Drake is a 54 y.o. who is currently admitted for SI.      1.  Add Seroquel 300mg QHS  2. Reviewed instructions, risks, benefits and side effects of medications  3. Disposition/Discharge Date: self-care/home, TD, hopefully early next week.     Sadia Bryan MD DR. [facility]  Psychiatry

## 2019-09-27 NOTE — PROGRESS NOTES
Problem: Falls - Risk of  Goal: *Absence of Falls  Description  Patient will remain free of falls each shift daily through hospital stay   Outcome: Progressing Towards Goal  Note:   Fall Risk Interventions:            Medication Interventions: Teach patient to arise slowly                   Problem: Suicide  Goal: *STG: Remains safe in hospital  Description  Patient will be free from injurious behavior through hospital stay   Outcome: Progressing Towards Goal     Problem: Suicide  Goal: *STG: Seeks staff when feelings of self harm or harm towards others arise  Description  Patient will verbalize to staff when feelings of self harm or harm towards others arise through hospital stay   Outcome: Progressing Towards Goal     Problem: Suicide  Goal: *STG: Attends activities and groups  Description  Patient will attend and participate in at least 3 of 4 groups and activities daily through hospital stay   Outcome: Progressing Towards Goal     Problem: Suicide  Goal: *STG:  Verbalizes alternative ways of dealing with maladaptive feelings/behaviors  Description  Patient will verbalize at least 3 alternative ways of dealing with maladaptive behaviors prior to discharge   Outcome: Progressing Towards Goal     Problem: Suicide  Goal: *STG/LTG: Complies with medication therapy  Description  Patient will receive medications as scheduled through hospital stay   Outcome: Progressing Towards Goal       Pt resting in bed during 1:1 assessment. Pt stating he's feeling a little tired from dialysis today. Pt states he's not feeling depressed at this time. Denies any suicidal/homicidal ideation. Pt states he is currently homeless. Discussed pt's plan when he discharged from the hospital. Pt states he just wants to get his life together. Asked pt if he could stay with family, pt stated he has burned bridges and since he made his bed hard he has to lie in it. Pt agreeable to come to staff if he has any thoughts of self harm.  Staff will continue to monitor for safety and provide a  supportive and therapeutic environment.

## 2019-09-27 NOTE — PROGRESS NOTES
Hemodialysis Rounding Note      Patient: Devorah Mcdonough               Sex: male          DOA: 9/24/2019  5:29 PM        YOB: 1970      Age:  54 y.o.        LOS:  LOS: 3 days     Subjective:     Devorah Mcdonough is a 54 y.o.  who presents with Major depressive disorder [F32.9]. The patient is dialyzing utilizing the following method:Intermittent Hemodialysis        Complaints: Feels great    Current Facility-Administered Medications   Medication Dose Route Frequency    insulin lispro (HUMALOG) injection   SubCUTAneous AC&HS    glucose chewable tablet 16 g  4 Tab Oral PRN    glucagon (GLUCAGEN) injection 1 mg  1 mg IntraMUSCular PRN    dextrose (D50W) injection syrg 12.5-25 g  25-50 mL IntraVENous PRN    heparin (porcine) 1,000 unit/mL injection 5,000 Units  5,000 Units IntraVENous DIALYSIS PRN    traMADol-acetaminophen (ULTRACET) per tablet 1 Tab  1 Tab Oral Q6H PRN    0.9% sodium chloride infusion  100 mL/hr IntraVENous DIALYSIS PRN    doxercalciferol (HECTOROL) 4 mcg/2 mL injection 3 mcg  3 mcg IntraVENous DIALYSIS MON, WED & FRI    calcium acetate (PHOSLO) capsule 2,001 mg  3 Cap Oral TID WITH MEALS    hydrALAZINE (APRESOLINE) tablet 100 mg  100 mg Oral TID    hydrOXYzine pamoate (VISTARIL) capsule 50 mg  50 mg Oral Q4H PRN    LORazepam (ATIVAN) injection 1-2 mg  1-2 mg IntraMUSCular Q4H PRN    traZODone (DESYREL) tablet 50 mg  50 mg Oral QHS PRN    acetaminophen (TYLENOL) tablet 500 mg  500 mg Oral Q6H PRN    amLODIPine (NORVASC) tablet 10 mg  10 mg Oral DAILY    FLUoxetine (PROzac) capsule 40 mg  40 mg Oral DAILY    budesonide-formoterol (SYMBICORT) 160-4.5 mcg/actuation HFA inhaler 2 Puff  2 Puff Inhalation BID RT    albuterol-ipratropium (DUO-NEB) 2.5 MG-0.5 MG/3 ML  3 mL Nebulization Q6H PRN       No intake/output data recorded.   09/25 1901 - 09/27 0700  In: -   Out: 7000       Objective:     Blood pressure (!) 171/98, pulse (!) 104, temperature 98.6 °F (37 °C), resp. rate 16. Temp (24hrs), Av.4 °F (36.9 °C), Min:98.2 °F (36.8 °C), Max:98.6 °F (37 °C)      Blood Pressure: BP: (!) 171/98(nurse notified)  Pulse: Pulse (Heart Rate): (!) 104  Temp:  Temp: 98.6 °F (37 °C)    Artificial Kidney Dialyzer/Set Up Inspection: Revaclear Max   hours Duration of Treatment (hours): 3 hours   Heparin Bolus Heparin Bolus (units): 4000 units  Blood flow rate Blood Flow Rate (ml/min): 0 ml/min   Dialysate rate     Arterial Access Pressure Arterial Access Pressure (mmHg): 0  Venous Return Pressure Venous Return Pressure (mmHg): 0  Ultrafiltration Rate Goal/Amount of Fluid to Remove (mL): 4000 mL  Fluid Removal Fluid Removed (mL): 3500  Net Fluid Removal NET Fluid Removed (mL): 3000 ml      PHYSICAL EXAM: alert, oriented x 3 afebrile,  appears in no distress  HEENT:  Non icteric  CHEST AND LUNGS:  Clear ant/lat  CVS:  Regular no rub  EXT:  Right arm avf     DATA REVIEW:    Labs: Results:       Chemistry Recent Labs     19  0611 19  0615 19  1133   * 104* 97   * 135* 137   K 4.5 4.7 5.2    98* 101   CO2 26 28 25   BUN 50* 46* 101*   CREA 7.92* 7.77* 12.30*   CA 8.7 8.5 6.9*  7.4*   AGAP 8 9 11   BUCR 6* 6* 8*   ALB  --   --  3.6      CBC w/Diff Recent Labs     19  1133   HGB 9.1*   HCT 27.8*      Coagulation No results for input(s): PTP, INR, APTT, INREXT, INREXT in the last 72 hours. Iron/Ferritin Recent Labs     19  1133   IRON 102      BNP No results for input(s): BNPP in the last 72 hours. Cardiac Enzymes No results for input(s): CPK, CKND1, KAJAL in the last 72 hours. No lab exists for component: CKRMB, TROIP   Liver Enzymes Recent Labs     19  1133   ALB 3.6      Thyroid Studies No results found for: T4, T3U, TSH, TSHEXT, TSHEXT     IPTH 1,072    Images:  XR Results (maximum last 3): No results found for this or any previous visit. CT Results (maximum last 3):   No results found for this or any previous visit. CULTURE:   )No results for input(s): SDES, CULT in the last 72 hours. No results for input(s): CULT in the last 72 hours. Assessment/Plan:     End Stage Renal Disease:  Patient is tolerating dialysis treatment well. .  Additionally the patient has experienced normal dialysis treatment during dialysis. Dry weight   same. Next week sched MWF. At 12:02 PM on 9/27/2019, I saw and examined patient during hemodialysis treatment. The patient was receiving hemodialysis for treatment of end stage renal disease. I have also reviewed vital signs, intake and output, lab results and recent events, and agreed with today's dialysis order. Anemia:  Hold Epo until BP is undercontrol. Renal Metabolic Bone Disease:  Phoslo with meals and IV hectorol /HD 3x a week                      Hypertension: Improving,  Cont same meds, trend with more UF, pt does not want to take more BP meds.     Access: Fistula adequate monitoring/no changes         Stiven Sawant MD  9/27/2019

## 2019-09-27 NOTE — BSMART NOTE
OCCUPATIONAL THERAPY PROGRESS NOTE Group Time:  5345 Attendance: The patient attended full group. Participation: The patient participated fully in the activity. Attention: The patient was able to focus on the activity. Interaction: The patient frequently interacts with others. Appropriate and focused with attention paid to peers responses.

## 2019-09-28 LAB
ANION GAP SERPL CALC-SCNC: 7 MMOL/L (ref 3–18)
BUN SERPL-MCNC: 41 MG/DL (ref 7–18)
BUN/CREAT SERPL: 6 (ref 12–20)
CALCIUM SERPL-MCNC: 8.8 MG/DL (ref 8.5–10.1)
CHLORIDE SERPL-SCNC: 97 MMOL/L (ref 100–111)
CO2 SERPL-SCNC: 29 MMOL/L (ref 21–32)
CREAT SERPL-MCNC: 6.99 MG/DL (ref 0.6–1.3)
GLUCOSE BLD STRIP.AUTO-MCNC: 112 MG/DL (ref 70–110)
GLUCOSE BLD STRIP.AUTO-MCNC: 132 MG/DL (ref 70–110)
GLUCOSE BLD STRIP.AUTO-MCNC: 93 MG/DL (ref 70–110)
GLUCOSE BLD STRIP.AUTO-MCNC: 99 MG/DL (ref 70–110)
GLUCOSE SERPL-MCNC: 91 MG/DL (ref 74–99)
POTASSIUM SERPL-SCNC: 4.5 MMOL/L (ref 3.5–5.5)
SODIUM SERPL-SCNC: 133 MMOL/L (ref 136–145)

## 2019-09-28 PROCEDURE — 36415 COLL VENOUS BLD VENIPUNCTURE: CPT

## 2019-09-28 PROCEDURE — 74011250637 HC RX REV CODE- 250/637: Performed by: PSYCHIATRY & NEUROLOGY

## 2019-09-28 PROCEDURE — 74011250637 HC RX REV CODE- 250/637: Performed by: INTERNAL MEDICINE

## 2019-09-28 PROCEDURE — 65220000003 HC RM SEMIPRIVATE PSYCH

## 2019-09-28 PROCEDURE — 82962 GLUCOSE BLOOD TEST: CPT

## 2019-09-28 PROCEDURE — 80048 BASIC METABOLIC PNL TOTAL CA: CPT

## 2019-09-28 RX ADMIN — FLUOXETINE 40 MG: 20 CAPSULE ORAL at 08:06

## 2019-09-28 RX ADMIN — CALCIUM ACETATE 2001 MG: 667 CAPSULE ORAL at 16:28

## 2019-09-28 RX ADMIN — CALCIUM ACETATE 2001 MG: 667 CAPSULE ORAL at 08:06

## 2019-09-28 RX ADMIN — BUDESONIDE AND FORMOTEROL FUMARATE DIHYDRATE 2 PUFF: 160; 4.5 AEROSOL RESPIRATORY (INHALATION) at 08:43

## 2019-09-28 RX ADMIN — HYDRALAZINE HYDROCHLORIDE 100 MG: 50 TABLET, FILM COATED ORAL at 08:06

## 2019-09-28 RX ADMIN — BUDESONIDE AND FORMOTEROL FUMARATE DIHYDRATE 2 PUFF: 160; 4.5 AEROSOL RESPIRATORY (INHALATION) at 19:59

## 2019-09-28 RX ADMIN — AMLODIPINE BESYLATE 10 MG: 5 TABLET ORAL at 08:06

## 2019-09-28 RX ADMIN — TRAMADOL HYDROCHLORIDE AND ACETAMINOPHEN 1 TABLET: 37.5; 325 TABLET, FILM COATED ORAL at 08:06

## 2019-09-28 RX ADMIN — HYDRALAZINE HYDROCHLORIDE 100 MG: 50 TABLET, FILM COATED ORAL at 14:41

## 2019-09-28 RX ADMIN — TRAMADOL HYDROCHLORIDE AND ACETAMINOPHEN 1 TABLET: 37.5; 325 TABLET, FILM COATED ORAL at 22:11

## 2019-09-28 RX ADMIN — QUETIAPINE FUMARATE 300 MG: 300 TABLET ORAL at 22:11

## 2019-09-28 RX ADMIN — HYDRALAZINE HYDROCHLORIDE 100 MG: 50 TABLET, FILM COATED ORAL at 22:07

## 2019-09-28 NOTE — BH NOTES
Pt observed interacting with staff and peers appropriately. Pt offered no c/o si/hi or avh. Pt presents depressed. Pt has been compliant with treatment plan. Pt did report improved mood. Will continue to support towards compliance  At pt's request, this writer and another staff member went to pt's personal vehicle in the ED parking lot and retrieved pt's clothing d/t pt not having any clean clothing and hospital not having any paper scrubs that fit pt. Pt was appreciative of staffs efforts to accommodate his needs. Pt's clothing items were documented on a new belonging sheet and put in pt chart.

## 2019-09-28 NOTE — PROGRESS NOTES
Patient was verbal regarding stress and depression related to dialysis 3 times a week. Questions, why me? Denies a desire to get on the transplant list because of fear of kidney rejection after transplant. Reports a motivator is his daughter and grand children. Voiced a recent break up with a woman that he gave up everything that he had in [place] to move here with. Continues to verbalize feeling depressed, but denies suicidal ideations and verbally contracts for safety.

## 2019-09-28 NOTE — BSMART NOTE
ART THERAPY GROUP PROGRESS NOTE PATIENT SCHEDULED FOR GROUP AT: 11:10 
 
ATTENDANCE: 3/4 PARTICIPATION LEVEL: Participates fully in the art process. ATTENTION LEVEL: Able to focus on task. FOCUS: Positivity SYMBOLIC & THEMATIC CONTENT AS NOTED IN IMAGERY: He presented with a calm mood and blunted affect and his thought processes were logical throughout the group. He was actively engaged in art therapy directive and his approach to task was methodical. He worked slowly and kept to himself for the duration of group. Thematic content noted in the imagery included a focus on self and self affirmation. He left the group early to go speak to the nurse.

## 2019-09-28 NOTE — PROGRESS NOTES
[address]  Inpatient Progress Note     Date of Service: 09/28/19  Hospital Day: 4     Subjective/Interval History   09/28/19    Treatment Team Notes:  Notes reviewed and/or discussed and report that Mu Staff is a 52y/o male admitted for suicidal ideation. Pt with ESRD on hemodialysis. No behavior issues. Patient interview: Mu Staff was interviewed by this writer today. Pt complains of fatigue otherwise mood is better. He is sleeping more than usual. He denies SI or hallucinations. Supportive therapy provided. Pt encouraged to work on relapse prevention plan. He plans on going for therapy and [facility]. He does not like NA meetings because he does not feel the people who tell their stories there are honest about their drug use.     Objective     Visit Vitals  BP (!) 156/95   Pulse 96   Temp 98 °F (36.7 °C)   Resp 16       Recent Results (from the past 24 hour(s))   GLUCOSE, POC    Collection Time: 09/27/19  4:37 PM   Result Value Ref Range    Glucose (POC) 152 (H) 70 - 110 mg/dL   GLUCOSE, POC    Collection Time: 09/27/19  8:10 PM   Result Value Ref Range    Glucose (POC) 147 (H) 70 - 110 mg/dL   GLUCOSE, POC    Collection Time: 09/28/19  5:33 AM   Result Value Ref Range    Glucose (POC) 99 70 - 385 mg/dL   METABOLIC PANEL, BASIC    Collection Time: 09/28/19  6:15 AM   Result Value Ref Range    Sodium 133 (L) 136 - 145 mmol/L    Potassium 4.5 3.5 - 5.5 mmol/L    Chloride 97 (L) 100 - 111 mmol/L    CO2 29 21 - 32 mmol/L    Anion gap 7 3.0 - 18 mmol/L    Glucose 91 74 - 99 mg/dL    BUN 41 (H) 7.0 - 18 MG/DL    Creatinine 6.99 (H) 0.6 - 1.3 MG/DL    BUN/Creatinine ratio 6 (L) 12 - 20      GFR est AA 10 (L) >60 ml/min/1.73m2    GFR est non-AA 8 (L) >60 ml/min/1.73m2    Calcium 8.8 8.5 - 10.1 MG/DL   GLUCOSE, POC    Collection Time: 09/28/19 11:39 AM   Result Value Ref Range    Glucose (POC) 112 (H) 70 - 110 mg/dL       Mental Status Examination Appearance/Hygiene 54 y.o. BLACK OR  male  Hygiene: fair   Behavior/Social Relatedness Appropriate   Musculoskeletal Gait/Station: appropriate  Tone (flaccid, cogwheeling, spastic): not assessed  Psychomotor (hyperkinetic, hypokinetic): calm   Involuntary movements (tics, dyskinesias, akathisa, stereotypies): none   Speech   Rate, rhythm, volume, fluency and articulation are appropriate   Mood   euthymic   Affect    congruent   Thought Process Linear and goal directed   Thought Content and Perceptual Disturbances Denies self-injurious behavior (SIB), suicidal ideation (SI), aggressive behavior or homicidal ideation (HI)    Denies auditory and visual hallucinations   Sensorium and Cognition  Grossly intact   Insight  limited   Judgment limited        Assessment/Plan      Psychiatric Diagnoses:   Patient Active Problem List   Diagnosis Code    Recurrent major depressive disorder (HCC) F33.9    Cocaine use disorder, severe, dependence ([facility] [address] F14.20    Cannabis use disorder, severe, dependence ([facility] [address] F12.20    Hypertension secondary to other renal disorders I15.1, N28.89    ESRD (end stage renal disease) on dialysis ([facility] [address] N18.6, Z99.2    Type 2 diabetes mellitus with complication, without long-term current use of insulin ([address] E11.8       Psychosocial and contextual factors: homelessness, drug use    Level of impairment/disability: severe Vanderbilt Blanch is a 54 y.o. who is currently admitted for SI.      1.  Continue current treatment. 2.  Reviewed instructions, risks, benefits and side effects of medications  3. Disposition/Discharge Date: self-care/home, Monday. Pt will go live with a friend and then will find another place to live after he gets his check.     Vignesh Anand MD DR. [facility]  Psychiatry

## 2019-09-29 LAB
GLUCOSE BLD STRIP.AUTO-MCNC: 135 MG/DL (ref 70–110)
GLUCOSE BLD STRIP.AUTO-MCNC: 83 MG/DL (ref 70–110)
GLUCOSE BLD STRIP.AUTO-MCNC: 96 MG/DL (ref 70–110)

## 2019-09-29 PROCEDURE — 74011250637 HC RX REV CODE- 250/637: Performed by: PSYCHIATRY & NEUROLOGY

## 2019-09-29 PROCEDURE — 82962 GLUCOSE BLOOD TEST: CPT

## 2019-09-29 PROCEDURE — 74011250637 HC RX REV CODE- 250/637: Performed by: INTERNAL MEDICINE

## 2019-09-29 PROCEDURE — 65220000003 HC RM SEMIPRIVATE PSYCH

## 2019-09-29 RX ORDER — QUETIAPINE FUMARATE 300 MG/1
300 TABLET, FILM COATED ORAL
Qty: 30 TAB | Refills: 0 | Status: SHIPPED | OUTPATIENT
Start: 2019-09-29

## 2019-09-29 RX ORDER — FLUOXETINE HYDROCHLORIDE 40 MG/1
40 CAPSULE ORAL DAILY
Qty: 30 CAP | Refills: 0 | Status: SHIPPED | OUTPATIENT
Start: 2019-09-29

## 2019-09-29 RX ADMIN — HYDRALAZINE HYDROCHLORIDE 100 MG: 50 TABLET, FILM COATED ORAL at 14:29

## 2019-09-29 RX ADMIN — TRAMADOL HYDROCHLORIDE AND ACETAMINOPHEN 1 TABLET: 37.5; 325 TABLET, FILM COATED ORAL at 07:59

## 2019-09-29 RX ADMIN — FLUOXETINE 40 MG: 20 CAPSULE ORAL at 08:00

## 2019-09-29 RX ADMIN — HYDRALAZINE HYDROCHLORIDE 100 MG: 50 TABLET, FILM COATED ORAL at 22:01

## 2019-09-29 RX ADMIN — QUETIAPINE FUMARATE 300 MG: 300 TABLET ORAL at 22:01

## 2019-09-29 RX ADMIN — BUDESONIDE AND FORMOTEROL FUMARATE DIHYDRATE 2 PUFF: 160; 4.5 AEROSOL RESPIRATORY (INHALATION) at 08:02

## 2019-09-29 RX ADMIN — CALCIUM ACETATE 2001 MG: 667 CAPSULE ORAL at 16:33

## 2019-09-29 RX ADMIN — HYDRALAZINE HYDROCHLORIDE 100 MG: 50 TABLET, FILM COATED ORAL at 07:59

## 2019-09-29 RX ADMIN — CALCIUM ACETATE 2001 MG: 667 CAPSULE ORAL at 07:59

## 2019-09-29 RX ADMIN — AMLODIPINE BESYLATE 10 MG: 5 TABLET ORAL at 08:00

## 2019-09-29 RX ADMIN — HYDROXYZINE PAMOATE 50 MG: 50 CAPSULE ORAL at 11:18

## 2019-09-29 RX ADMIN — CALCIUM ACETATE 2001 MG: 667 CAPSULE ORAL at 11:18

## 2019-09-29 RX ADMIN — ACETAMINOPHEN 500 MG: 500 TABLET ORAL at 22:01

## 2019-09-29 RX ADMIN — BUDESONIDE AND FORMOTEROL FUMARATE DIHYDRATE 2 PUFF: 160; 4.5 AEROSOL RESPIRATORY (INHALATION) at 19:32

## 2019-09-29 NOTE — PROGRESS NOTES
Problem: Falls - Risk of  Goal: *Absence of Falls  Description  Patient will remain free of falls each shift daily through hospital stay   Outcome: Progressing Towards Goal  Note:   Fall Risk Interventions:            Medication Interventions: Teach patient to arise slowly              Aeb pt free of falls this shift     Problem: Suicide  Goal: *STG: Remains safe in hospital  Description  Patient will be free from injurious behavior through hospital stay   Outcome: Progressing Towards Goal  Note:   Aeb pt free of harm this shift  Goal: *STG/LTG: Complies with medication therapy  Description  Patient will receive medications as scheduled through hospital stay   Outcome: Progressing Towards Goal  Note:   Aeb pt taking all meds as prescribed this shift     Pt observed in milieu for majority of shift, watching TV and interacting with peers. Pt attending groups. Pt compliant with all meds and accuchecks. Pt did not require sliding scale insulin. Pt given 50 mg po hydroxyzine prn for anxiety. Pt denies SI/HI/avh at this time. Will continue to monitor for safety.

## 2019-09-29 NOTE — PROGRESS NOTES
[address] [facility] [address]  Inpatient Progress Note     Date of Service: 09/29/19  Hospital Day: 5     Subjective/Interval History   09/29/19    Treatment Team Notes:  Notes reviewed and/or discussed and report that Tarsha Dalal is a 54y/o male admitted for suicidal ideation. Pt with ESRD on hemodialysis. No behavior issues. Patient interview: Tarsha Dalal was interviewed by this writer today. Pt complains of fatigue otherwise mood is better. He is sleeping more than usual. He denies SI or hallucinations. Supportive therapy provided. He is looking forward to discharge tomorrow morning. Tolerating current medication regimen. Thinking about starting to go to [facility]. Objective     Visit Vitals  BP (!) 193/106   Pulse (!) 105   Temp 98.4 °F (36.9 °C)   Resp 16       Recent Results (from the past 24 hour(s))   GLUCOSE, POC    Collection Time: 09/28/19  8:13 PM   Result Value Ref Range    Glucose (POC) 132 (H) 70 - 110 mg/dL   GLUCOSE, POC    Collection Time: 09/29/19  7:09 AM   Result Value Ref Range    Glucose (POC) 96 70 - 110 mg/dL   GLUCOSE, POC    Collection Time: 09/29/19 11:15 AM   Result Value Ref Range    Glucose (POC) 135 (H) 70 - 110 mg/dL   GLUCOSE, POC    Collection Time: 09/29/19  4:30 PM   Result Value Ref Range    Glucose (POC) 83 70 - 110 mg/dL       Mental Status Examination     Appearance/Hygiene 54 y.o.  BLACK OR  male  Hygiene: fair   Behavior/Social Relatedness Appropriate   Musculoskeletal Gait/Station: appropriate  Tone (flaccid, cogwheeling, spastic): not assessed  Psychomotor (hyperkinetic, hypokinetic): calm   Involuntary movements (tics, dyskinesias, akathisa, stereotypies): none   Speech   Rate, rhythm, volume, fluency and articulation are appropriate   Mood   euthymic   Affect    congruent   Thought Process Linear and goal directed   Thought Content and Perceptual Disturbances Denies self-injurious behavior (SIB), suicidal ideation (SI), aggressive behavior or homicidal ideation (HI)    Denies auditory and visual hallucinations   Sensorium and Cognition  Grossly intact   Insight  limited   Judgment limited        Assessment/Plan      Psychiatric Diagnoses:   Patient Active Problem List   Diagnosis Code    Recurrent major depressive disorder ([facility] [address] F33.9    Cocaine use disorder, severe, dependence ([facility] [address] F14.20    Cannabis use disorder, severe, dependence ([facility] [address] F12.20    Hypertension secondary to other renal disorders I15.1, N28.89    ESRD (end stage renal disease) on dialysis ([facility] [address] N18.6, Z99.2    Type 2 diabetes mellitus with complication, without long-term current use of insulin ([address] E11.8       Psychosocial and contextual factors: homelessness, drug use    Level of impairment/disability: severe Dorothea Dopp is a 54 y.o. who is currently admitted for SI.      1.  Continue current treatment. 2.  Disposition/Discharge Date: self-care/home, Monday. Pt will go live with a friend and then will find another place to live after he gets his check.     MD DR. [facility]  Psychiatry

## 2019-09-29 NOTE — GROUP NOTE
KHARI  GROUP DOCUMENTATION INDIVIDUAL Group Therapy Note Date: September 28 Group Start Time: 2000 Group End Time: 2015 Group Topic: Nursing [facility] ADULT CHEM DEP Bri Peralta [facility] GROUP DOCUMENTATION GROUP Group Therapy Note Attendees: 8 Attendance: Attended Interventions/techniques: Informed Follows Directions: Followed directions Interactions: Interacted appropriately Mental Status: Calm Behavior/appearance: Cooperative Goals Achieved: Able to listen to others Bernardo Huizar RN

## 2019-09-29 NOTE — BSMART NOTE
ART THERAPY GROUP PROGRESS NOTE PATIENT SCHEDULED FOR GROUP AT: 1300 ATTENDANCE: Full PARTICIPATION LEVEL: Participates fully in the art process. ATTENTION LEVEL: Able to focus on task. FOCUS: Grounding SYMBOLIC & THEMATIC CONTENT AS NOTED IN IMAGERY: He presented with a depressed mood and blunted affect and his thought processes were logical throughout the group. He was actively engaged in the art making directive and his approach to task was methodical. Thematic content was concrete and utilize writing and words, thematic content focused on perseverance. He displayed a high ability for frustration tolerance in his artwork.

## 2019-09-30 VITALS
RESPIRATION RATE: 16 BRPM | SYSTOLIC BLOOD PRESSURE: 193 MMHG | DIASTOLIC BLOOD PRESSURE: 93 MMHG | TEMPERATURE: 98.2 F | HEART RATE: 105 BPM

## 2019-09-30 LAB
ALBUMIN SERPL-MCNC: 3.8 G/DL (ref 3.4–5)
ANION GAP SERPL CALC-SCNC: 10 MMOL/L (ref 3–18)
BASOPHILS # BLD: 0.1 K/UL (ref 0–0.1)
BASOPHILS NFR BLD: 1 % (ref 0–2)
BUN SERPL-MCNC: 89 MG/DL (ref 7–18)
BUN/CREAT SERPL: 7 (ref 12–20)
CALCIUM SERPL-MCNC: 8.9 MG/DL (ref 8.5–10.1)
CHLORIDE SERPL-SCNC: 99 MMOL/L (ref 100–111)
CO2 SERPL-SCNC: 25 MMOL/L (ref 21–32)
CREAT SERPL-MCNC: 12.4 MG/DL (ref 0.6–1.3)
DIFFERENTIAL METHOD BLD: ABNORMAL
EOSINOPHIL # BLD: 0.4 K/UL (ref 0–0.4)
EOSINOPHIL NFR BLD: 6 % (ref 0–5)
ERYTHROCYTE [DISTWIDTH] IN BLOOD BY AUTOMATED COUNT: 15.8 % (ref 11.6–14.5)
GLUCOSE BLD STRIP.AUTO-MCNC: 95 MG/DL (ref 70–110)
GLUCOSE SERPL-MCNC: 90 MG/DL (ref 74–99)
HCT VFR BLD AUTO: 27.8 % (ref 36–48)
HGB BLD-MCNC: 9 G/DL (ref 13–16)
LYMPHOCYTES # BLD: 1.7 K/UL (ref 0.9–3.6)
LYMPHOCYTES NFR BLD: 23 % (ref 21–52)
MCH RBC QN AUTO: 29.5 PG (ref 24–34)
MCHC RBC AUTO-ENTMCNC: 32.4 G/DL (ref 31–37)
MCV RBC AUTO: 91.1 FL (ref 74–97)
MONOCYTES # BLD: 0.5 K/UL (ref 0.05–1.2)
MONOCYTES NFR BLD: 7 % (ref 3–10)
NEUTS SEG # BLD: 4.7 K/UL (ref 1.8–8)
NEUTS SEG NFR BLD: 63 % (ref 40–73)
PHOSPHATE SERPL-MCNC: 4.4 MG/DL (ref 2.5–4.9)
PLATELET # BLD AUTO: 257 K/UL (ref 135–420)
PMV BLD AUTO: 10.4 FL (ref 9.2–11.8)
POTASSIUM SERPL-SCNC: 4.9 MMOL/L (ref 3.5–5.5)
RBC # BLD AUTO: 3.05 M/UL (ref 4.7–5.5)
SODIUM SERPL-SCNC: 134 MMOL/L (ref 136–145)
WBC # BLD AUTO: 7.5 K/UL (ref 4.6–13.2)

## 2019-09-30 PROCEDURE — 74011250637 HC RX REV CODE- 250/637: Performed by: PSYCHIATRY & NEUROLOGY

## 2019-09-30 PROCEDURE — 85025 COMPLETE CBC W/AUTO DIFF WBC: CPT

## 2019-09-30 PROCEDURE — 74011250637 HC RX REV CODE- 250/637: Performed by: INTERNAL MEDICINE

## 2019-09-30 PROCEDURE — 80069 RENAL FUNCTION PANEL: CPT

## 2019-09-30 PROCEDURE — 36415 COLL VENOUS BLD VENIPUNCTURE: CPT

## 2019-09-30 PROCEDURE — 82962 GLUCOSE BLOOD TEST: CPT

## 2019-09-30 RX ADMIN — BUDESONIDE AND FORMOTEROL FUMARATE DIHYDRATE 2 PUFF: 160; 4.5 AEROSOL RESPIRATORY (INHALATION) at 08:08

## 2019-09-30 RX ADMIN — HYDRALAZINE HYDROCHLORIDE 100 MG: 50 TABLET, FILM COATED ORAL at 08:05

## 2019-09-30 RX ADMIN — HYDROXYZINE PAMOATE 50 MG: 50 CAPSULE ORAL at 05:54

## 2019-09-30 RX ADMIN — AMLODIPINE BESYLATE 10 MG: 5 TABLET ORAL at 08:05

## 2019-09-30 RX ADMIN — ACETAMINOPHEN 500 MG: 500 TABLET ORAL at 05:54

## 2019-09-30 RX ADMIN — FLUOXETINE 40 MG: 20 CAPSULE ORAL at 08:05

## 2019-09-30 NOTE — BH NOTES
Pt pacing in front of nurses station cursing and verbally threatening staff. Pt demanding to have his belongings brought to unit from storage area. Pt unreceptive to staff attempts to explain discharge process as RN's waiting on appointments and medicaid cab to be set up. Pt became increasingly agitated and threatening. Pt grabbed this writers computer threatening \"I bet if I smash all three of these computers shit will get done then. \" MD then came to desk and attempted to reassure pt that staff is working on what is needed for discharge.  MD agreeable to pt going downstairs to await for transportation

## 2019-09-30 NOTE — BH NOTES
Pt reports he is feeling fine. Pt was present in the milieu today. Pt would like help with depression and housing. Pt states his medication is working. Pt contracts for safety on the unit and reports his right shoulder is hurting. Pt did not have any visitors today. Pt has the support of his brother and sister. Pt plans after discharge is to follow up with [facility]. Pt goal is to go home tomorrow. Staff will continue to monitor pt for behavior safety and location.

## 2019-09-30 NOTE — BH NOTES
Pt discharged to care of self and provided with bus pass due to continued escalation of threatening behaviors on the unit. Pt refused to sign discharge paperwork. Pt has not endorsed SI. Pt aware of follow up appointment as he scheduled it prior to admission.  Belongings and valuables returned witnessed by additional RN

## 2019-09-30 NOTE — DISCHARGE INSTRUCTIONS
BEHAVIORAL HEALTH NURSING DISCHARGE NOTE      The following personal items collected during your admission are returned to you:   Dental Appliance: Dental Appliances: None  Vision:    Hearing Aid:    Jewelry: Jewelry: Other (comment)(patient's locker)  Clothing: Clothing: With patient  Other Valuables: Other Valuables: Other (comment)(storage)  Valuables sent to safe:        PATIENT INSTRUCTIONS:    Your health and safety is very important to us; we remind you to commit to safety and recovery. Should you have any thoughts of harming yourself or others please dial 911, utilize your support systems, the coping strategies you have learned as well as the [address] [place] [address] at [phone] or visit their website at https://suicidepreventionlifeline.org/    The following are some additional coping strategies that you can utilize if you start to feel anxious, angry, stressed or depressed    1. Exercise (running, walking, etc.). 2. Write (poetry, stories, journal). 3. Be with other people. 4. Watch a favorite TV show. 5. Practice Mindfulness  6. Watch a funny/favorite movie  7. Do some deep breathing  8. Take a hot shower or relaxing bath. 9. Play with a pet. 10. Help others  11. Read a good book. 12. Listen to music. 13. Try some aromatherapy (candle, lotion, room spray). 14. Meditate. 15. Paint or draw. 16. Rip paper into itty-bitty pieces. 17. Shoot hoops, kick a ball. 18. Hug a pillow or stuffed animal.  19. Dance. 20. Take up a new hobby. 21. [place]. 25. Make a list of blessings in your life. 23. Contact a hotline/ your therapist.  24. Talk to someone close to you. 25. Yoga. 32. Nelda Orr a friend or family member. 32. Make a list of goals for the week/month/year/5 years. The discharge information has been reviewed with the patient. The patient verbalized understanding.       Patient armband removed and shredded      ***IMPORTANT NUMBERS***        [facility]        [phone] [address] [facility]       [phone]    Suicide Prevention     [phone]

## 2019-09-30 NOTE — PROGRESS NOTES
Hemodialysis Rounding Note      Patient: Megan Cortez               Sex: male          DOA: 2019  5:29 PM        YOB: 1970      Age:  54 y.o.        LOS:  LOS: 6 days     Subjective:           Complaints: Feels great  Awaiting for his cab, wants to go home and requesting HD outpatient his unit. Current Facility-Administered Medications   Medication Dose Route Frequency    epoetin jo-epbx (RETACRIT) 5,000 Units  5,000 Units SubCUTAneous Q MON, WED & FRI    QUEtiapine (SEROquel) tablet 300 mg  300 mg Oral QHS    insulin lispro (HUMALOG) injection   SubCUTAneous AC&HS    glucose chewable tablet 16 g  4 Tab Oral PRN    glucagon (GLUCAGEN) injection 1 mg  1 mg IntraMUSCular PRN    dextrose (D50W) injection syrg 12.5-25 g  25-50 mL IntraVENous PRN    heparin (porcine) 1,000 unit/mL injection 5,000 Units  5,000 Units IntraVENous DIALYSIS PRN    0.9% sodium chloride infusion  100 mL/hr IntraVENous DIALYSIS PRN    doxercalciferol (HECTOROL) 4 mcg/2 mL injection 3 mcg  3 mcg IntraVENous DIALYSIS MON, WED & FRI    calcium acetate (PHOSLO) capsule 2,001 mg  3 Cap Oral TID WITH MEALS    hydrALAZINE (APRESOLINE) tablet 100 mg  100 mg Oral TID    hydrOXYzine pamoate (VISTARIL) capsule 50 mg  50 mg Oral Q4H PRN    LORazepam (ATIVAN) injection 1-2 mg  1-2 mg IntraMUSCular Q4H PRN    acetaminophen (TYLENOL) tablet 500 mg  500 mg Oral Q6H PRN    amLODIPine (NORVASC) tablet 10 mg  10 mg Oral DAILY    FLUoxetine (PROzac) capsule 40 mg  40 mg Oral DAILY    budesonide-formoterol (SYMBICORT) 160-4.5 mcg/actuation HFA inhaler 2 Puff  2 Puff Inhalation BID RT    albuterol-ipratropium (DUO-NEB) 2.5 MG-0.5 MG/3 ML  3 mL Nebulization Q6H PRN       No intake/output data recorded. No intake/output data recorded. Objective:     Blood pressure (!) 193/93, pulse (!) 105, temperature 98.2 °F (36.8 °C), resp. rate 16.   Temp (24hrs), Av.2 °F (36.8 °C), Min:98.2 °F (36.8 °C), Max:98.2 °F (36.8 °C)      Blood Pressure: BP: (!) 193/93(BP meds given )  Pulse: Pulse (Heart Rate): (!) 105(BP meds given )  Temp:  Temp: 98.2 °F (36.8 °C)    Artificial Kidney Dialyzer/Set Up Inspection: Revaclear   hours Duration of Treatment (hours): 3 hours   Heparin Bolus Heparin Bolus (units): 4000 units  Blood flow rate Blood Flow Rate (ml/min): 450 ml/min   Dialysate rate     Arterial Access Pressure Arterial Access Pressure (mmHg): -170  Venous Return Pressure Venous Return Pressure (mmHg): 220  Ultrafiltration Rate Goal/Amount of Fluid to Remove (mL): 4000 mL  Fluid Removal Fluid Removed (mL): 3500  Net Fluid Removal NET Fluid Removed (mL): 3000 ml      PHYSICAL EXAM: alert, oriented x 3 afebrile,  appears in no distress  HEENT:  Non icteric  CHEST AND LUNGS:  Clear ant/lat  CVS:  Regular no rub  EXT:  Right arm avf     DATA REVIEW:    Labs: Results:       Chemistry Recent Labs     09/30/19  0610 09/28/19  0615   GLU 90 91   * 133*   K 4.9 4.5   CL 99* 97*   CO2 25 29   BUN 89* 41*   CREA 12.40* 6.99*   CA 8.9 8.8   AGAP 10 7   BUCR 7* 6*   ALB 3.8  --       CBC w/Diff Recent Labs     09/30/19  0610   WBC 7.5   RBC 3.05*   HGB 9.0*   HCT 27.8*      GRANS 63   LYMPH 23   EOS 6*      Coagulation No results for input(s): PTP, INR, APTT, INREXT, INREXT in the last 72 hours. Iron/Ferritin No results for input(s): IRON in the last 72 hours. No lab exists for component: TIBCCALC   BNP No results for input(s): BNPP in the last 72 hours. Cardiac Enzymes No results for input(s): CPK, CKND1, KAJAL in the last 72 hours. No lab exists for component: CKRMB, TROIP   Liver Enzymes Recent Labs     09/30/19  0610   ALB 3.8      Thyroid Studies No results found for: T4, T3U, TSH, TSHEXT, TSHEXT     IPTH 1,072    Images:  XR Results (maximum last 3): No results found for this or any previous visit. CT Results (maximum last 3): No results found for this or any previous visit.     CULTURE:   )No results for input(s): SDES, CULT in the last 72 hours. No results for input(s): CULT in the last 72 hours. Assessment/Plan:     End Stage Renal Disease:  Plan for discharge today . Patient request outpatient HD today , schedule for this afternoon. Anemia:  Hold Epo until BP is undercontrol.      Renal Metabolic Bone Disease:  Phoslo with meals and IV hectorol /HD 3x a week                      Hypertension: encourage for compliance   Access: Fistula adequate monitoring/no changes         Landry Homans, MD  9/30/2019
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
